# Patient Record
Sex: MALE | Race: BLACK OR AFRICAN AMERICAN | NOT HISPANIC OR LATINO
[De-identification: names, ages, dates, MRNs, and addresses within clinical notes are randomized per-mention and may not be internally consistent; named-entity substitution may affect disease eponyms.]

---

## 2017-01-03 ENCOUNTER — RESULT CHARGE (OUTPATIENT)
Age: 57
End: 2017-01-03

## 2017-01-03 ENCOUNTER — APPOINTMENT (OUTPATIENT)
Dept: INTERNAL MEDICINE | Facility: CLINIC | Age: 57
End: 2017-01-03

## 2017-01-03 ENCOUNTER — OUTPATIENT (OUTPATIENT)
Dept: OUTPATIENT SERVICES | Facility: HOSPITAL | Age: 57
LOS: 1 days | End: 2017-01-03
Payer: MEDICARE

## 2017-01-03 VITALS
BODY MASS INDEX: 26.72 KG/M2 | WEIGHT: 197 LBS | DIASTOLIC BLOOD PRESSURE: 78 MMHG | HEART RATE: 72 BPM | SYSTOLIC BLOOD PRESSURE: 140 MMHG

## 2017-01-03 DIAGNOSIS — E11.9 TYPE 2 DIABETES MELLITUS WITHOUT COMPLICATIONS: ICD-10-CM

## 2017-01-03 DIAGNOSIS — I10 ESSENTIAL (PRIMARY) HYPERTENSION: ICD-10-CM

## 2017-01-03 PROCEDURE — G0463: CPT

## 2017-01-04 LAB — HBA1C MFR BLD HPLC: 7.2

## 2017-01-24 ENCOUNTER — MEDICATION RENEWAL (OUTPATIENT)
Age: 57
End: 2017-01-24

## 2017-01-30 ENCOUNTER — RX RENEWAL (OUTPATIENT)
Age: 57
End: 2017-01-30

## 2017-03-27 ENCOUNTER — APPOINTMENT (OUTPATIENT)
Dept: GASTROENTEROLOGY | Facility: CLINIC | Age: 57
End: 2017-03-27

## 2017-03-27 VITALS
TEMPERATURE: 98.4 F | OXYGEN SATURATION: 97 % | BODY MASS INDEX: 26.82 KG/M2 | DIASTOLIC BLOOD PRESSURE: 70 MMHG | WEIGHT: 198 LBS | HEART RATE: 79 BPM | SYSTOLIC BLOOD PRESSURE: 138 MMHG | HEIGHT: 72 IN | RESPIRATION RATE: 15 BRPM

## 2017-03-27 DIAGNOSIS — F17.200 NICOTINE DEPENDENCE, UNSPECIFIED, UNCOMPLICATED: ICD-10-CM

## 2017-03-31 ENCOUNTER — RX RENEWAL (OUTPATIENT)
Age: 57
End: 2017-03-31

## 2017-03-31 ENCOUNTER — CHART COPY (OUTPATIENT)
Age: 57
End: 2017-03-31

## 2017-04-11 ENCOUNTER — APPOINTMENT (OUTPATIENT)
Dept: GASTROENTEROLOGY | Facility: HOSPITAL | Age: 57
End: 2017-04-11

## 2017-06-16 ENCOUNTER — APPOINTMENT (OUTPATIENT)
Dept: GASTROENTEROLOGY | Facility: HOSPITAL | Age: 57
End: 2017-06-16

## 2017-06-16 ENCOUNTER — RX RENEWAL (OUTPATIENT)
Age: 57
End: 2017-06-16

## 2017-07-12 ENCOUNTER — OUTPATIENT (OUTPATIENT)
Dept: OUTPATIENT SERVICES | Facility: HOSPITAL | Age: 57
LOS: 1 days | End: 2017-07-12

## 2017-07-12 ENCOUNTER — OUTPATIENT (OUTPATIENT)
Dept: OUTPATIENT SERVICES | Facility: HOSPITAL | Age: 57
LOS: 1 days | End: 2017-07-12
Payer: MEDICARE

## 2017-07-12 ENCOUNTER — APPOINTMENT (OUTPATIENT)
Dept: INTERNAL MEDICINE | Facility: CLINIC | Age: 57
End: 2017-07-12

## 2017-07-12 ENCOUNTER — RESULT CHARGE (OUTPATIENT)
Age: 57
End: 2017-07-12

## 2017-07-12 VITALS
DIASTOLIC BLOOD PRESSURE: 95 MMHG | WEIGHT: 197.09 LBS | HEART RATE: 69 BPM | SYSTOLIC BLOOD PRESSURE: 153 MMHG | OXYGEN SATURATION: 99 % | RESPIRATION RATE: 16 BRPM | TEMPERATURE: 98 F | HEIGHT: 72 IN

## 2017-07-12 VITALS
WEIGHT: 200 LBS | SYSTOLIC BLOOD PRESSURE: 120 MMHG | BODY MASS INDEX: 27.09 KG/M2 | HEIGHT: 72 IN | DIASTOLIC BLOOD PRESSURE: 90 MMHG

## 2017-07-12 DIAGNOSIS — Z12.11 ENCOUNTER FOR SCREENING FOR MALIGNANT NEOPLASM OF COLON: ICD-10-CM

## 2017-07-12 DIAGNOSIS — Z98.890 OTHER SPECIFIED POSTPROCEDURAL STATES: Chronic | ICD-10-CM

## 2017-07-12 DIAGNOSIS — E11.9 TYPE 2 DIABETES MELLITUS WITHOUT COMPLICATIONS: ICD-10-CM

## 2017-07-12 DIAGNOSIS — L89.892 PRESSURE ULCER OF OTHER SITE, STAGE 2: ICD-10-CM

## 2017-07-12 DIAGNOSIS — L02.31 CUTANEOUS ABSCESS OF BUTTOCK: Chronic | ICD-10-CM

## 2017-07-12 DIAGNOSIS — Z93.1 GASTROSTOMY STATUS: Chronic | ICD-10-CM

## 2017-07-12 DIAGNOSIS — I10 ESSENTIAL (PRIMARY) HYPERTENSION: ICD-10-CM

## 2017-07-12 DIAGNOSIS — Z01.818 ENCOUNTER FOR OTHER PREPROCEDURAL EXAMINATION: ICD-10-CM

## 2017-07-12 LAB
HCT VFR BLD CALC: 39.6 % — SIGNIFICANT CHANGE UP (ref 39–50)
HGB BLD-MCNC: 12.7 G/DL — LOW (ref 13–17)
MCHC RBC-ENTMCNC: 28.7 PG — SIGNIFICANT CHANGE UP (ref 27–34)
MCHC RBC-ENTMCNC: 32.1 GM/DL — SIGNIFICANT CHANGE UP (ref 32–36)
MCV RBC AUTO: 89.4 FL — SIGNIFICANT CHANGE UP (ref 80–100)
PLATELET # BLD AUTO: 164 K/UL — SIGNIFICANT CHANGE UP (ref 150–400)
RBC # BLD: 4.43 M/UL — SIGNIFICANT CHANGE UP (ref 4.2–5.8)
RBC # FLD: 13.3 % — SIGNIFICANT CHANGE UP (ref 10.3–14.5)
WBC # BLD: 5.47 K/UL — SIGNIFICANT CHANGE UP (ref 3.8–10.5)
WBC # FLD AUTO: 5.47 K/UL — SIGNIFICANT CHANGE UP (ref 3.8–10.5)

## 2017-07-12 PROCEDURE — 80048 BASIC METABOLIC PNL TOTAL CA: CPT

## 2017-07-12 PROCEDURE — 85027 COMPLETE CBC AUTOMATED: CPT

## 2017-07-12 PROCEDURE — G0463: CPT

## 2017-07-12 PROCEDURE — 82962 GLUCOSE BLOOD TEST: CPT

## 2017-07-12 RX ORDER — LISINOPRIL 2.5 MG/1
1 TABLET ORAL
Qty: 0 | Refills: 0 | COMMUNITY

## 2017-07-12 RX ORDER — ASPIRIN/CALCIUM CARB/MAGNESIUM 324 MG
1 TABLET ORAL
Qty: 0 | Refills: 0 | COMMUNITY

## 2017-07-12 RX ORDER — FLUTICASONE PROPIONATE 50 MCG
1 SPRAY, SUSPENSION NASAL
Qty: 0 | Refills: 0 | COMMUNITY

## 2017-07-12 RX ORDER — LABETALOL HCL 100 MG
500 TABLET ORAL
Qty: 0 | Refills: 0 | COMMUNITY

## 2017-07-12 RX ORDER — SIMVASTATIN 20 MG/1
1 TABLET, FILM COATED ORAL
Qty: 0 | Refills: 0 | COMMUNITY

## 2017-07-12 RX ORDER — AMLODIPINE BESYLATE 2.5 MG/1
1 TABLET ORAL
Qty: 0 | Refills: 0 | COMMUNITY

## 2017-07-12 RX ORDER — METFORMIN HYDROCHLORIDE 850 MG/1
1 TABLET ORAL
Qty: 0 | Refills: 0 | COMMUNITY

## 2017-07-12 NOTE — H&P PST ADULT - NS MD HP SKIN WOUND STATUS
2x2 cm ulcer Right anterior lower leg, no drainage, no signs of infection, no erythema. Ulcer was evaluated today, 07/12/17 by Dr. LEILA Kinney, Bacitracin ointment prescribed

## 2017-07-12 NOTE — H&P PST ADULT - PRIMARY CARE PROVIDER
Dr. LEILA Kinney ([facility], [address] [place] [address]) Dr. LEILA Kinney ([facility], [address] [place] [address]) [phone]

## 2017-07-12 NOTE — H&P PST ADULT - HISTORY OF PRESENT ILLNESS
57 y.o. male with h/o DM2, HTN, former drug use, anoxic brain injury in 2009 secondary to hypoglycemic episode presents for screening Colonoscopy. He denies any GI symptoms.

## 2017-07-12 NOTE — H&P PST ADULT - NSANTHOSAYNRD_GEN_A_CORE
No. FORTUNATO screening performed.  STOP BANG Legend: 0-2 = LOW Risk; 3-4 = INTERMEDIATE Risk; 5-8 = HIGH Risk

## 2017-07-12 NOTE — H&P PST ADULT - PMH
Anoxic brain injury  2009 secondary to hypoglycemic episode while on Insulin  Deviated nasal septum    Diabetes Mellitus  Type 2  Drug abuse in remission  since 2009  HTN    Hyperlipidemia    Leg ulcer, right, limited to breakdown of skin Anoxic brain injury  2009 secondary to hypoglycemic episode while on Insulin  Deviated nasal septum    Diabetes Mellitus  Type 2  Drug abuse in remission  since 2009  HTN    Hyperlipidemia    Leg ulcer, right, limited to breakdown of skin  07/2017  MRSA infection  Left buttock, 2008

## 2017-07-12 NOTE — H&P PST ADULT - PSH
H/O arthroscopy of right knee  2000  S/P percutaneous endoscopic gastrostomy (PEG) tube placement  2009, s/p anoxic brain injury, diabetic coma H/O arthroscopy of right knee  2000  Left buttock abscess  I&D, 2008 - MRSA  S/P percutaneous endoscopic gastrostomy (PEG) tube placement  2009, s/p anoxic brain injury, diabetic coma

## 2017-07-13 LAB
ANION GAP SERPL CALC-SCNC: 19 MMOL/L — HIGH (ref 5–17)
BUN SERPL-MCNC: 20 MG/DL — SIGNIFICANT CHANGE UP (ref 7–23)
CALCIUM SERPL-MCNC: 10.7 MG/DL — HIGH (ref 8.4–10.5)
CHLORIDE SERPL-SCNC: 98 MMOL/L — SIGNIFICANT CHANGE UP (ref 96–108)
CO2 SERPL-SCNC: 24 MMOL/L — SIGNIFICANT CHANGE UP (ref 22–31)
CREAT SERPL-MCNC: 1.09 MG/DL — SIGNIFICANT CHANGE UP (ref 0.5–1.3)
GLUCOSE BLDC GLUCOMTR-MCNC: 208
GLUCOSE SERPL-MCNC: 174 MG/DL — HIGH (ref 70–99)
HBA1C MFR BLD HPLC: 8.1
POTASSIUM SERPL-MCNC: 3.9 MMOL/L — SIGNIFICANT CHANGE UP (ref 3.5–5.3)
POTASSIUM SERPL-SCNC: 3.9 MMOL/L — SIGNIFICANT CHANGE UP (ref 3.5–5.3)
SODIUM SERPL-SCNC: 141 MMOL/L — SIGNIFICANT CHANGE UP (ref 135–145)

## 2017-07-21 ENCOUNTER — APPOINTMENT (OUTPATIENT)
Dept: PODIATRY | Facility: HOSPITAL | Age: 57
End: 2017-07-21

## 2017-07-21 ENCOUNTER — OUTPATIENT (OUTPATIENT)
Dept: OUTPATIENT SERVICES | Facility: HOSPITAL | Age: 57
LOS: 1 days | End: 2017-07-21
Payer: MEDICARE

## 2017-07-21 VITALS
SYSTOLIC BLOOD PRESSURE: 158 MMHG | BODY MASS INDEX: 27.09 KG/M2 | HEIGHT: 72 IN | WEIGHT: 200 LBS | RESPIRATION RATE: 14 BRPM | DIASTOLIC BLOOD PRESSURE: 72 MMHG

## 2017-07-21 DIAGNOSIS — E11.9 TYPE 2 DIABETES MELLITUS WITHOUT COMPLICATIONS: ICD-10-CM

## 2017-07-21 DIAGNOSIS — Z98.890 OTHER SPECIFIED POSTPROCEDURAL STATES: Chronic | ICD-10-CM

## 2017-07-21 DIAGNOSIS — B35.3 TINEA PEDIS: ICD-10-CM

## 2017-07-21 DIAGNOSIS — Z93.1 GASTROSTOMY STATUS: Chronic | ICD-10-CM

## 2017-07-21 DIAGNOSIS — M79.609 PAIN IN UNSPECIFIED LIMB: ICD-10-CM

## 2017-07-21 DIAGNOSIS — L02.31 CUTANEOUS ABSCESS OF BUTTOCK: Chronic | ICD-10-CM

## 2017-07-21 DIAGNOSIS — M20.41 OTHER HAMMER TOE(S) (ACQUIRED), RIGHT FOOT: ICD-10-CM

## 2017-07-21 PROCEDURE — G0463: CPT

## 2017-07-26 ENCOUNTER — RESULT REVIEW (OUTPATIENT)
Age: 57
End: 2017-07-26

## 2017-07-26 ENCOUNTER — APPOINTMENT (OUTPATIENT)
Dept: GASTROENTEROLOGY | Facility: HOSPITAL | Age: 57
End: 2017-07-26

## 2017-07-26 ENCOUNTER — OUTPATIENT (OUTPATIENT)
Dept: OUTPATIENT SERVICES | Facility: HOSPITAL | Age: 57
LOS: 1 days | End: 2017-07-26
Payer: MEDICARE

## 2017-07-26 DIAGNOSIS — Z98.890 OTHER SPECIFIED POSTPROCEDURAL STATES: Chronic | ICD-10-CM

## 2017-07-26 DIAGNOSIS — Z01.818 ENCOUNTER FOR OTHER PREPROCEDURAL EXAMINATION: ICD-10-CM

## 2017-07-26 DIAGNOSIS — L02.31 CUTANEOUS ABSCESS OF BUTTOCK: Chronic | ICD-10-CM

## 2017-07-26 DIAGNOSIS — Z12.11 ENCOUNTER FOR SCREENING FOR MALIGNANT NEOPLASM OF COLON: ICD-10-CM

## 2017-07-26 DIAGNOSIS — Z93.1 GASTROSTOMY STATUS: Chronic | ICD-10-CM

## 2017-07-26 PROCEDURE — 45380 COLONOSCOPY AND BIOPSY: CPT | Mod: PT

## 2017-07-26 PROCEDURE — 88305 TISSUE EXAM BY PATHOLOGIST: CPT | Mod: 26

## 2017-07-26 PROCEDURE — 43239 EGD BIOPSY SINGLE/MULTIPLE: CPT

## 2017-07-26 PROCEDURE — 88305 TISSUE EXAM BY PATHOLOGIST: CPT

## 2017-08-02 ENCOUNTER — OTHER (OUTPATIENT)
Age: 57
End: 2017-08-02

## 2017-08-18 ENCOUNTER — APPOINTMENT (OUTPATIENT)
Dept: PODIATRY | Facility: HOSPITAL | Age: 57
End: 2017-08-18

## 2017-08-18 ENCOUNTER — OUTPATIENT (OUTPATIENT)
Dept: OUTPATIENT SERVICES | Facility: HOSPITAL | Age: 57
LOS: 1 days | End: 2017-08-18
Payer: MEDICARE

## 2017-08-18 VITALS
SYSTOLIC BLOOD PRESSURE: 164 MMHG | HEIGHT: 72 IN | BODY MASS INDEX: 26.01 KG/M2 | RESPIRATION RATE: 16 BRPM | DIASTOLIC BLOOD PRESSURE: 93 MMHG | HEART RATE: 73 BPM | WEIGHT: 192 LBS

## 2017-08-18 DIAGNOSIS — Z93.1 GASTROSTOMY STATUS: Chronic | ICD-10-CM

## 2017-08-18 DIAGNOSIS — M79.609 PAIN IN UNSPECIFIED LIMB: ICD-10-CM

## 2017-08-18 DIAGNOSIS — L30.9 DERMATITIS, UNSPECIFIED: ICD-10-CM

## 2017-08-18 DIAGNOSIS — Z98.890 OTHER SPECIFIED POSTPROCEDURAL STATES: Chronic | ICD-10-CM

## 2017-08-18 DIAGNOSIS — M20.41 OTHER HAMMER TOE(S) (ACQUIRED), RIGHT FOOT: ICD-10-CM

## 2017-08-18 DIAGNOSIS — E11.49 TYPE 2 DIABETES MELLITUS WITH OTHER DIABETIC NEUROLOGICAL COMPLICATION: ICD-10-CM

## 2017-08-18 DIAGNOSIS — L02.31 CUTANEOUS ABSCESS OF BUTTOCK: Chronic | ICD-10-CM

## 2017-08-18 PROCEDURE — G0463: CPT

## 2017-09-22 ENCOUNTER — APPOINTMENT (OUTPATIENT)
Dept: PODIATRY | Facility: HOSPITAL | Age: 57
End: 2017-09-22

## 2017-09-25 ENCOUNTER — RX RENEWAL (OUTPATIENT)
Age: 57
End: 2017-09-25

## 2017-10-03 ENCOUNTER — RX RENEWAL (OUTPATIENT)
Age: 57
End: 2017-10-03

## 2017-10-30 ENCOUNTER — RX RENEWAL (OUTPATIENT)
Age: 57
End: 2017-10-30

## 2017-11-27 ENCOUNTER — RX RENEWAL (OUTPATIENT)
Age: 57
End: 2017-11-27

## 2017-12-01 ENCOUNTER — RX RENEWAL (OUTPATIENT)
Age: 57
End: 2017-12-01

## 2018-01-08 ENCOUNTER — RX RENEWAL (OUTPATIENT)
Age: 58
End: 2018-01-08

## 2018-02-01 ENCOUNTER — APPOINTMENT (OUTPATIENT)
Dept: OPHTHALMOLOGY | Facility: CLINIC | Age: 58
End: 2018-02-01
Payer: MEDICARE

## 2018-02-01 PROCEDURE — 92133 CPTRZD OPH DX IMG PST SGM ON: CPT

## 2018-02-01 PROCEDURE — 92014 COMPRE OPH EXAM EST PT 1/>: CPT

## 2018-02-05 ENCOUNTER — MEDICATION RENEWAL (OUTPATIENT)
Age: 58
End: 2018-02-05

## 2018-02-05 ENCOUNTER — OUTPATIENT (OUTPATIENT)
Dept: OUTPATIENT SERVICES | Facility: HOSPITAL | Age: 58
LOS: 1 days | End: 2018-02-05
Payer: MEDICARE

## 2018-02-05 ENCOUNTER — APPOINTMENT (OUTPATIENT)
Dept: INTERNAL MEDICINE | Facility: CLINIC | Age: 58
End: 2018-02-05
Payer: MEDICARE

## 2018-02-05 VITALS
DIASTOLIC BLOOD PRESSURE: 70 MMHG | WEIGHT: 197 LBS | SYSTOLIC BLOOD PRESSURE: 110 MMHG | HEIGHT: 72 IN | BODY MASS INDEX: 26.68 KG/M2

## 2018-02-05 DIAGNOSIS — Z98.890 OTHER SPECIFIED POSTPROCEDURAL STATES: Chronic | ICD-10-CM

## 2018-02-05 DIAGNOSIS — E11.9 TYPE 2 DIABETES MELLITUS WITHOUT COMPLICATIONS: ICD-10-CM

## 2018-02-05 DIAGNOSIS — I10 ESSENTIAL (PRIMARY) HYPERTENSION: ICD-10-CM

## 2018-02-05 DIAGNOSIS — L02.31 CUTANEOUS ABSCESS OF BUTTOCK: Chronic | ICD-10-CM

## 2018-02-05 DIAGNOSIS — F17.200 NICOTINE DEPENDENCE, UNSPECIFIED, UNCOMPLICATED: ICD-10-CM

## 2018-02-05 DIAGNOSIS — Z93.1 GASTROSTOMY STATUS: Chronic | ICD-10-CM

## 2018-02-05 PROCEDURE — 99213 OFFICE O/P EST LOW 20 MIN: CPT | Mod: GE

## 2018-02-05 PROCEDURE — G0463: CPT

## 2018-02-06 ENCOUNTER — APPOINTMENT (OUTPATIENT)
Dept: OTOLARYNGOLOGY | Facility: CLINIC | Age: 58
End: 2018-02-06
Payer: MEDICARE

## 2018-02-06 VITALS
SYSTOLIC BLOOD PRESSURE: 161 MMHG | HEIGHT: 72 IN | BODY MASS INDEX: 26.68 KG/M2 | DIASTOLIC BLOOD PRESSURE: 76 MMHG | WEIGHT: 197 LBS | HEART RATE: 75 BPM

## 2018-02-06 DIAGNOSIS — Z82.1 FAMILY HISTORY OF BLINDNESS AND VISUAL LOSS: ICD-10-CM

## 2018-02-06 LAB — HBA1C MFR BLD HPLC: 7.5

## 2018-02-06 PROCEDURE — 92567 TYMPANOMETRY: CPT

## 2018-02-06 PROCEDURE — 92557 COMPREHENSIVE HEARING TEST: CPT

## 2018-02-06 PROCEDURE — 99214 OFFICE O/P EST MOD 30 MIN: CPT

## 2018-02-08 ENCOUNTER — APPOINTMENT (OUTPATIENT)
Dept: OPHTHALMOLOGY | Facility: CLINIC | Age: 58
End: 2018-02-08
Payer: MEDICARE

## 2018-02-08 PROCEDURE — 92083 EXTENDED VISUAL FIELD XM: CPT

## 2018-02-08 PROCEDURE — 92012 INTRM OPH EXAM EST PATIENT: CPT

## 2018-02-08 PROCEDURE — 76514 ECHO EXAM OF EYE THICKNESS: CPT

## 2018-03-16 ENCOUNTER — APPOINTMENT (OUTPATIENT)
Dept: INTERNAL MEDICINE | Facility: CLINIC | Age: 58
End: 2018-03-16

## 2018-03-29 ENCOUNTER — APPOINTMENT (OUTPATIENT)
Dept: OPHTHALMOLOGY | Facility: CLINIC | Age: 58
End: 2018-03-29

## 2018-04-02 ENCOUNTER — MEDICATION RENEWAL (OUTPATIENT)
Age: 58
End: 2018-04-02

## 2018-04-16 ENCOUNTER — APPOINTMENT (OUTPATIENT)
Dept: INTERNAL MEDICINE | Facility: CLINIC | Age: 58
End: 2018-04-16
Payer: MEDICARE

## 2018-04-16 ENCOUNTER — LABORATORY RESULT (OUTPATIENT)
Age: 58
End: 2018-04-16

## 2018-04-16 ENCOUNTER — OUTPATIENT (OUTPATIENT)
Dept: OUTPATIENT SERVICES | Facility: HOSPITAL | Age: 58
LOS: 1 days | End: 2018-04-16
Payer: MEDICARE

## 2018-04-16 VITALS
SYSTOLIC BLOOD PRESSURE: 130 MMHG | BODY MASS INDEX: 26.68 KG/M2 | HEIGHT: 72 IN | WEIGHT: 197 LBS | DIASTOLIC BLOOD PRESSURE: 90 MMHG

## 2018-04-16 DIAGNOSIS — I10 ESSENTIAL (PRIMARY) HYPERTENSION: ICD-10-CM

## 2018-04-16 DIAGNOSIS — E11.9 TYPE 2 DIABETES MELLITUS WITHOUT COMPLICATIONS: ICD-10-CM

## 2018-04-16 DIAGNOSIS — L02.31 CUTANEOUS ABSCESS OF BUTTOCK: Chronic | ICD-10-CM

## 2018-04-16 DIAGNOSIS — Z98.890 OTHER SPECIFIED POSTPROCEDURAL STATES: Chronic | ICD-10-CM

## 2018-04-16 DIAGNOSIS — F17.200 NICOTINE DEPENDENCE, UNSPECIFIED, UNCOMPLICATED: ICD-10-CM

## 2018-04-16 DIAGNOSIS — Z93.1 GASTROSTOMY STATUS: Chronic | ICD-10-CM

## 2018-04-16 PROCEDURE — 82043 UR ALBUMIN QUANTITATIVE: CPT

## 2018-04-16 PROCEDURE — G0463: CPT

## 2018-04-16 PROCEDURE — 99213 OFFICE O/P EST LOW 20 MIN: CPT | Mod: GE

## 2018-04-17 LAB
CREAT ?TM UR-MCNC: 60 MG/DL — SIGNIFICANT CHANGE UP
MICROALBUMIN UR-MCNC: 26.2 MG/DL — SIGNIFICANT CHANGE UP
MICROALBUMIN/CREAT UR-RTO: 437 MG/G — HIGH (ref 0–30)

## 2018-04-18 LAB
ALBUMIN SERPL ELPH-MCNC: 4.2 G/DL
ALP BLD-CCNC: 81 U/L
ALT SERPL-CCNC: 21 U/L
ANION GAP SERPL CALC-SCNC: 13 MMOL/L
AST SERPL-CCNC: 22 U/L
BASOPHILS # BLD AUTO: 0.02 K/UL
BASOPHILS NFR BLD AUTO: 0.4 %
BILIRUB SERPL-MCNC: 0.4 MG/DL
BUN SERPL-MCNC: 17 MG/DL
CALCIUM SERPL-MCNC: 9.9 MG/DL
CHLORIDE SERPL-SCNC: 102 MMOL/L
CHOLEST SERPL-MCNC: 128 MG/DL
CHOLEST/HDLC SERPL: 3.3 RATIO
CO2 SERPL-SCNC: 24 MMOL/L
CREAT SERPL-MCNC: 1 MG/DL
EOSINOPHIL # BLD AUTO: 0.35 K/UL
EOSINOPHIL NFR BLD AUTO: 6.4 %
GLUCOSE SERPL-MCNC: 197 MG/DL
HBA1C MFR BLD HPLC: 7 %
HCT VFR BLD CALC: 36.6 %
HDLC SERPL-MCNC: 39 MG/DL
HGB BLD-MCNC: 12.1 G/DL
IMM GRANULOCYTES NFR BLD AUTO: 0.2 %
LDLC SERPL CALC-MCNC: 70 MG/DL
LYMPHOCYTES # BLD AUTO: 1.81 K/UL
LYMPHOCYTES NFR BLD AUTO: 33.2 %
MAN DIFF?: NORMAL
MCHC RBC-ENTMCNC: 29.7 PG
MCHC RBC-ENTMCNC: 33.1 GM/DL
MCV RBC AUTO: 89.7 FL
MONOCYTES # BLD AUTO: 0.33 K/UL
MONOCYTES NFR BLD AUTO: 6 %
NEUTROPHILS # BLD AUTO: 2.94 K/UL
NEUTROPHILS NFR BLD AUTO: 53.8 %
PLATELET # BLD AUTO: 152 K/UL
POTASSIUM SERPL-SCNC: 3.9 MMOL/L
PROT SERPL-MCNC: 7.5 G/DL
RBC # BLD: 4.08 M/UL
RBC # FLD: 13.5 %
SODIUM SERPL-SCNC: 139 MMOL/L
TRIGL SERPL-MCNC: 93 MG/DL
WBC # FLD AUTO: 5.46 K/UL

## 2018-04-18 RX ORDER — POLYETHYLENE GLYCOL 3350 AND ELECTROLYTES WITH LEMON FLAVOR 236; 22.74; 6.74; 5.86; 2.97 G/4L; G/4L; G/4L; G/4L; G/4L
236 POWDER, FOR SOLUTION ORAL
Qty: 1 | Refills: 0 | Status: DISCONTINUED | COMMUNITY
Start: 2017-03-27 | End: 2018-04-18

## 2018-04-18 RX ORDER — TRIAMCINOLONE ACETONIDE 1 MG/G
0.1 CREAM TOPICAL TWICE DAILY
Qty: 1 | Refills: 3 | Status: DISCONTINUED | COMMUNITY
Start: 2017-08-18 | End: 2018-04-18

## 2018-04-18 RX ORDER — OSTOMY SUPPLY 2 3/4"
EACH MISCELLANEOUS
Qty: 14 | Refills: 0 | Status: DISCONTINUED | COMMUNITY
Start: 2017-07-12 | End: 2018-04-18

## 2018-04-18 RX ORDER — NICOTINE 21 MG/24HR
14 PATCH, TRANSDERMAL 24 HOURS TRANSDERMAL
Qty: 30 | Refills: 0 | Status: DISCONTINUED | COMMUNITY
Start: 2017-03-24 | End: 2018-04-18

## 2018-04-18 RX ORDER — CLOTRIMAZOLE 10 MG/ML
1 SOLUTION TOPICAL
Qty: 30 | Refills: 5 | Status: DISCONTINUED | COMMUNITY
Start: 2017-07-21 | End: 2018-04-18

## 2018-04-18 RX ORDER — FLUTICASONE PROPIONATE 50 UG/1
50 SPRAY, METERED NASAL DAILY
Qty: 1 | Refills: 3 | Status: DISCONTINUED | COMMUNITY
Start: 2018-02-05 | End: 2018-04-18

## 2018-07-16 PROBLEM — L97.911: Chronic | Status: ACTIVE | Noted: 2017-07-12

## 2018-07-16 PROBLEM — G93.1 ANOXIC BRAIN DAMAGE, NOT ELSEWHERE CLASSIFIED: Chronic | Status: ACTIVE | Noted: 2017-07-12

## 2018-07-19 ENCOUNTER — MEDICATION RENEWAL (OUTPATIENT)
Age: 58
End: 2018-07-19

## 2018-10-12 ENCOUNTER — MEDICATION RENEWAL (OUTPATIENT)
Age: 58
End: 2018-10-12

## 2018-10-15 ENCOUNTER — RX RENEWAL (OUTPATIENT)
Age: 58
End: 2018-10-15

## 2018-10-17 ENCOUNTER — RX RENEWAL (OUTPATIENT)
Age: 58
End: 2018-10-17

## 2018-11-02 PROBLEM — A49.02 METHICILLIN RESISTANT STAPHYLOCOCCUS AUREUS INFECTION, UNSPECIFIED SITE: Chronic | Status: ACTIVE | Noted: 2017-07-12

## 2018-11-02 PROBLEM — E78.5 HYPERLIPIDEMIA, UNSPECIFIED: Chronic | Status: ACTIVE | Noted: 2017-07-12

## 2018-11-02 PROBLEM — J34.2 DEVIATED NASAL SEPTUM: Chronic | Status: ACTIVE | Noted: 2017-07-12

## 2018-11-02 PROBLEM — Z87.898 PERSONAL HISTORY OF OTHER SPECIFIED CONDITIONS: Chronic | Status: ACTIVE | Noted: 2017-07-12

## 2018-12-18 ENCOUNTER — MED ADMIN CHARGE (OUTPATIENT)
Age: 58
End: 2018-12-18

## 2018-12-18 ENCOUNTER — APPOINTMENT (OUTPATIENT)
Dept: INTERNAL MEDICINE | Facility: CLINIC | Age: 58
End: 2018-12-18
Payer: MEDICARE

## 2018-12-18 ENCOUNTER — OUTPATIENT (OUTPATIENT)
Dept: OUTPATIENT SERVICES | Facility: HOSPITAL | Age: 58
LOS: 1 days | End: 2018-12-18
Payer: MEDICARE

## 2018-12-18 VITALS
HEIGHT: 72 IN | WEIGHT: 190 LBS | DIASTOLIC BLOOD PRESSURE: 90 MMHG | BODY MASS INDEX: 25.73 KG/M2 | SYSTOLIC BLOOD PRESSURE: 142 MMHG

## 2018-12-18 DIAGNOSIS — F17.200 NICOTINE DEPENDENCE, UNSPECIFIED, UNCOMPLICATED: ICD-10-CM

## 2018-12-18 DIAGNOSIS — I10 ESSENTIAL (PRIMARY) HYPERTENSION: ICD-10-CM

## 2018-12-18 DIAGNOSIS — Z93.1 GASTROSTOMY STATUS: Chronic | ICD-10-CM

## 2018-12-18 DIAGNOSIS — Z98.890 OTHER SPECIFIED POSTPROCEDURAL STATES: Chronic | ICD-10-CM

## 2018-12-18 DIAGNOSIS — E11.9 TYPE 2 DIABETES MELLITUS WITHOUT COMPLICATIONS: ICD-10-CM

## 2018-12-18 DIAGNOSIS — L02.31 CUTANEOUS ABSCESS OF BUTTOCK: Chronic | ICD-10-CM

## 2018-12-18 PROCEDURE — 90656 IIV3 VACC NO PRSV 0.5 ML IM: CPT

## 2018-12-18 PROCEDURE — G0463: CPT

## 2018-12-18 PROCEDURE — G0008: CPT

## 2018-12-18 PROCEDURE — 99213 OFFICE O/P EST LOW 20 MIN: CPT | Mod: GE

## 2018-12-18 RX ORDER — FLUTICASONE PROPIONATE 50 UG/1
50 SPRAY, METERED NASAL DAILY
Qty: 1 | Refills: 3 | Status: DISCONTINUED | COMMUNITY
Start: 2018-04-02 | End: 2018-12-18

## 2018-12-18 RX ORDER — FLUTICASONE PROPIONATE 50 UG/1
50 SPRAY, METERED NASAL DAILY
Qty: 1 | Refills: 3 | Status: DISCONTINUED | COMMUNITY
Start: 2018-07-19 | End: 2018-12-18

## 2018-12-18 RX ORDER — FLUTICASONE PROPIONATE 50 UG/1
50 SPRAY, METERED NASAL DAILY
Qty: 1 | Refills: 3 | Status: DISCONTINUED | COMMUNITY
Start: 2018-11-20 | End: 2018-12-18

## 2018-12-18 RX ORDER — NICOTINE 21 MG/24HR
14 PATCH, TRANSDERMAL 24 HOURS TRANSDERMAL DAILY
Qty: 90 | Refills: 1 | Status: DISCONTINUED | COMMUNITY
Start: 2017-03-21 | End: 2018-12-18

## 2018-12-18 RX ORDER — FLUTICASONE PROPIONATE 50 UG/1
50 SPRAY, METERED NASAL DAILY
Qty: 1 | Refills: 3 | Status: DISCONTINUED | COMMUNITY
Start: 2018-02-06 | End: 2018-12-18

## 2018-12-18 NOTE — PHYSICAL EXAM
[No Acute Distress] : no acute distress [Well Nourished] : well nourished [Well Developed] : well developed [Normal Sclera/Conjunctiva] : normal sclera/conjunctiva [PERRL] : pupils equal round and reactive to light [EOMI] : extraocular movements intact [Normal Outer Ear/Nose] : the outer ears and nose were normal in appearance [Normal Oropharynx] : the oropharynx was normal [No JVD] : no jugular venous distention [Supple] : supple [No Respiratory Distress] : no respiratory distress  [Clear to Auscultation] : lungs were clear to auscultation bilaterally [No Accessory Muscle Use] : no accessory muscle use [Normal Rate] : normal rate  [Regular Rhythm] : with a regular rhythm [Normal S1, S2] : normal S1 and S2 [Soft] : abdomen soft [Non Tender] : non-tender [Non-distended] : non-distended [No CVA Tenderness] : no CVA  tenderness [No Spinal Tenderness] : no spinal tenderness [No Joint Swelling] : no joint swelling [Grossly Normal Strength/Tone] : grossly normal strength/tone [No Rash] : no rash [No Skin Lesions] : no skin lesions [Normal Gait] : normal gait [Coordination Grossly Intact] : coordination grossly intact [No Focal Deficits] : no focal deficits [Speech Grossly Normal] : speech grossly normal [Normal Affect] : the affect was normal [Normal Mood] : the mood was normal

## 2018-12-19 NOTE — REVIEW OF SYSTEMS
[Fever] : no fever [Chills] : no chills [Earache] : no earache [Hearing Loss] : no hearing loss [Sore Throat] : no sore throat [Chest Pain] : no chest pain [Palpitations] : no palpitations [Shortness Of Breath] : no shortness of breath [Cough] : no cough [Abdominal Pain] : no abdominal pain [Nausea] : no nausea [Constipation] : no constipation [Diarrhea] : no diarrhea [Vomiting] : no vomiting [Dysuria] : no dysuria [Frequency] : no frequency [Joint Pain] : no joint pain [Back Pain] : no back pain [Itching] : no itching [Skin Rash] : no skin rash [Headache] : no headache [Dizziness] : no dizziness

## 2018-12-19 NOTE — ASSESSMENT
[FreeTextEntry1] : Mr. Matias is a 56 yo male with history of DM2, HTN, anoxic brain injury 2/2 hypoglycemic episode 5 years ago, HLD present for follow up visit. \par \par \par #DM\par -Check POCT A1c\par -Continue with current regimen metformin 1g BID\par -C/w lifestyle and diet modification\par -Ophthalmology and podiatry referral given\par \par #HTN\par -BP well controlled\par -C/w current regimen\par \par #Nicotine Dependence\par -Smoking cessation program referral given\par -Explained extensively regarding risk of smoking. \par \par #HCM\par -Flu shot given\par -Colonoscopy UTD in 2017 - 5mm polyp in ascending colon - negative for malignancy - repeat in 10 years\par -RTC in 4 months for CPE.\par \par \par D/w Dr. Arroyo\par \par He Dwight - PGY2\par [employer]

## 2018-12-19 NOTE — HISTORY OF PRESENT ILLNESS
[de-identified] : Mr. Matias is a 58 yo male with history of DM2, HTN, anoxic brain injury 2/2 hypoglycemic episode 5 years ago, HLD present for follow up visit. \par \par Patient has not been checking his FS howevere reports has been eating healthy. Still smoking about 1/2 to 1 pack per day. Has not tried to use the nicotine patches. Does not check BP at home but stating will try to get a BP machine. No other complaint at this time. \par

## 2019-01-08 ENCOUNTER — RX RENEWAL (OUTPATIENT)
Age: 59
End: 2019-01-08

## 2019-02-25 ENCOUNTER — RX RENEWAL (OUTPATIENT)
Age: 59
End: 2019-02-25

## 2019-06-10 ENCOUNTER — LABORATORY RESULT (OUTPATIENT)
Age: 59
End: 2019-06-10

## 2019-06-11 ENCOUNTER — APPOINTMENT (OUTPATIENT)
Dept: INTERNAL MEDICINE | Facility: CLINIC | Age: 59
End: 2019-06-11
Payer: MEDICARE

## 2019-06-11 ENCOUNTER — OUTPATIENT (OUTPATIENT)
Dept: OUTPATIENT SERVICES | Facility: HOSPITAL | Age: 59
LOS: 1 days | End: 2019-06-11
Payer: MEDICARE

## 2019-06-11 VITALS
DIASTOLIC BLOOD PRESSURE: 80 MMHG | WEIGHT: 184 LBS | BODY MASS INDEX: 26.05 KG/M2 | HEART RATE: 90 BPM | SYSTOLIC BLOOD PRESSURE: 154 MMHG | HEIGHT: 70.5 IN | OXYGEN SATURATION: 96 %

## 2019-06-11 DIAGNOSIS — H90.3 SENSORINEURAL HEARING LOSS, BILATERAL: ICD-10-CM

## 2019-06-11 DIAGNOSIS — H40.003 PREGLAUCOMA, UNSPECIFIED, BILATERAL: ICD-10-CM

## 2019-06-11 DIAGNOSIS — Z12.11 ENCOUNTER FOR SCREENING FOR MALIGNANT NEOPLASM OF COLON: ICD-10-CM

## 2019-06-11 DIAGNOSIS — Z87.2 PERSONAL HISTORY OF DISEASES OF THE SKIN AND SUBCUTANEOUS TISSUE: ICD-10-CM

## 2019-06-11 DIAGNOSIS — E11.49 TYPE 2 DIABETES MELLITUS WITH OTHER DIABETIC NEUROLOGICAL COMPLICATION: ICD-10-CM

## 2019-06-11 DIAGNOSIS — H69.82 OTHER SPECIFIED DISORDERS OF EUSTACHIAN TUBE, LEFT EAR: ICD-10-CM

## 2019-06-11 DIAGNOSIS — Z92.29 PERSONAL HISTORY OF OTHER DRUG THERAPY: ICD-10-CM

## 2019-06-11 DIAGNOSIS — R43.9 UNSPECIFIED DISTURBANCES OF SMELL AND TASTE: ICD-10-CM

## 2019-06-11 DIAGNOSIS — B35.3 TINEA PEDIS: ICD-10-CM

## 2019-06-11 DIAGNOSIS — Z98.890 OTHER SPECIFIED POSTPROCEDURAL STATES: Chronic | ICD-10-CM

## 2019-06-11 DIAGNOSIS — R07.0 PAIN IN THROAT: ICD-10-CM

## 2019-06-11 DIAGNOSIS — H90.5 UNSPECIFIED SENSORINEURAL HEARING LOSS: ICD-10-CM

## 2019-06-11 DIAGNOSIS — M20.41 OTHER HAMMER TOE(S) (ACQUIRED), RIGHT FOOT: ICD-10-CM

## 2019-06-11 DIAGNOSIS — Z87.09 PERSONAL HISTORY OF OTHER DISEASES OF THE RESPIRATORY SYSTEM: ICD-10-CM

## 2019-06-11 DIAGNOSIS — M20.42 OTHER HAMMER TOE(S) (ACQUIRED), RIGHT FOOT: ICD-10-CM

## 2019-06-11 DIAGNOSIS — L02.31 CUTANEOUS ABSCESS OF BUTTOCK: Chronic | ICD-10-CM

## 2019-06-11 DIAGNOSIS — Z93.1 GASTROSTOMY STATUS: Chronic | ICD-10-CM

## 2019-06-11 DIAGNOSIS — I10 ESSENTIAL (PRIMARY) HYPERTENSION: ICD-10-CM

## 2019-06-11 PROCEDURE — 85027 COMPLETE CBC AUTOMATED: CPT

## 2019-06-11 PROCEDURE — 90715 TDAP VACCINE 7 YRS/> IM: CPT

## 2019-06-11 PROCEDURE — 99213 OFFICE O/P EST LOW 20 MIN: CPT | Mod: GE

## 2019-06-11 PROCEDURE — 36415 COLL VENOUS BLD VENIPUNCTURE: CPT

## 2019-06-11 PROCEDURE — 80053 COMPREHEN METABOLIC PANEL: CPT

## 2019-06-11 PROCEDURE — G0463: CPT

## 2019-06-11 PROCEDURE — 83036 HEMOGLOBIN GLYCOSYLATED A1C: CPT

## 2019-06-11 PROCEDURE — 80061 LIPID PANEL: CPT

## 2019-06-11 PROCEDURE — 82043 UR ALBUMIN QUANTITATIVE: CPT

## 2019-06-11 PROCEDURE — 90471 IMMUNIZATION ADMIN: CPT

## 2019-06-11 NOTE — PHYSICAL EXAM
[No Acute Distress] : no acute distress [Well Nourished] : well nourished [Well Developed] : well developed [Normal Sclera/Conjunctiva] : normal sclera/conjunctiva [PERRL] : pupils equal round and reactive to light [Normal Outer Ear/Nose] : the outer ears and nose were normal in appearance [EOMI] : extraocular movements intact [No JVD] : no jugular venous distention [Clear to Auscultation] : lungs were clear to auscultation bilaterally [No Respiratory Distress] : no respiratory distress  [Supple] : supple [Normal Rate] : normal rate  [Regular Rhythm] : with a regular rhythm [No Murmur] : no murmur heard [Normal S1, S2] : normal S1 and S2 [Soft] : abdomen soft [Non Tender] : non-tender [Non-distended] : non-distended [No CVA Tenderness] : no CVA  tenderness [No Spinal Tenderness] : no spinal tenderness [No Joint Swelling] : no joint swelling [Grossly Normal Strength/Tone] : grossly normal strength/tone [No Rash] : no rash [No Skin Lesions] : no skin lesions [Normal Gait] : normal gait [Coordination Grossly Intact] : coordination grossly intact [No Focal Deficits] : no focal deficits [Speech Grossly Normal] : speech grossly normal [Normal Affect] : the affect was normal [Normal Mood] : the mood was normal

## 2019-06-12 LAB
ALBUMIN SERPL ELPH-MCNC: 4.4 G/DL — SIGNIFICANT CHANGE UP (ref 3.3–5)
ALP SERPL-CCNC: 114 U/L — SIGNIFICANT CHANGE UP (ref 40–120)
ALT FLD-CCNC: 23 U/L — SIGNIFICANT CHANGE UP (ref 10–45)
ANION GAP SERPL CALC-SCNC: 13 MMOL/L — SIGNIFICANT CHANGE UP (ref 5–17)
AST SERPL-CCNC: 15 U/L — SIGNIFICANT CHANGE UP (ref 10–40)
BILIRUB SERPL-MCNC: 0.5 MG/DL — SIGNIFICANT CHANGE UP (ref 0.2–1.2)
BUN SERPL-MCNC: 13 MG/DL — SIGNIFICANT CHANGE UP (ref 7–23)
CALCIUM SERPL-MCNC: 10.2 MG/DL — SIGNIFICANT CHANGE UP (ref 8.4–10.5)
CHLORIDE SERPL-SCNC: 105 MMOL/L — SIGNIFICANT CHANGE UP (ref 96–108)
CHOLEST SERPL-MCNC: 144 MG/DL — SIGNIFICANT CHANGE UP (ref 10–199)
CO2 SERPL-SCNC: 26 MMOL/L — SIGNIFICANT CHANGE UP (ref 22–31)
CREAT ?TM UR-MCNC: 256 MG/DL — SIGNIFICANT CHANGE UP
CREAT SERPL-MCNC: 1.18 MG/DL — SIGNIFICANT CHANGE UP (ref 0.5–1.3)
ESTIMATED AVERAGE GLUCOSE: 160 MG/DL — HIGH (ref 68–114)
GLUCOSE SERPL-MCNC: 205 MG/DL — HIGH (ref 70–99)
HBA1C BLD-MCNC: 7.2 % — HIGH (ref 4–5.6)
HCT VFR BLD CALC: 39.6 % — SIGNIFICANT CHANGE UP (ref 39–50)
HDLC SERPL-MCNC: 41 MG/DL — SIGNIFICANT CHANGE UP
HGB BLD-MCNC: 12.3 G/DL — LOW (ref 13–17)
LIPID PNL WITH DIRECT LDL SERPL: 79 MG/DL — SIGNIFICANT CHANGE UP
MCHC RBC-ENTMCNC: 29.6 PG — SIGNIFICANT CHANGE UP (ref 27–34)
MCHC RBC-ENTMCNC: 31.1 GM/DL — LOW (ref 32–36)
MCV RBC AUTO: 95.2 FL — SIGNIFICANT CHANGE UP (ref 80–100)
MICROALBUMIN UR-MCNC: 168.3 MG/DL — SIGNIFICANT CHANGE UP
MICROALBUMIN UR-MCNC: 168.3 MG/DL — SIGNIFICANT CHANGE UP
MICROALBUMIN/CREAT UR-RTO: 659 MG/G — HIGH (ref 0–30)
PLATELET # BLD AUTO: 166 K/UL — SIGNIFICANT CHANGE UP (ref 150–400)
POTASSIUM SERPL-MCNC: 3.9 MMOL/L — SIGNIFICANT CHANGE UP (ref 3.5–5.3)
POTASSIUM SERPL-SCNC: 3.9 MMOL/L — SIGNIFICANT CHANGE UP (ref 3.5–5.3)
PROT SERPL-MCNC: 7.1 G/DL — SIGNIFICANT CHANGE UP (ref 6–8.3)
RBC # BLD: 4.16 M/UL — LOW (ref 4.2–5.8)
RBC # FLD: 13.3 % — SIGNIFICANT CHANGE UP (ref 10.3–14.5)
SODIUM SERPL-SCNC: 144 MMOL/L — SIGNIFICANT CHANGE UP (ref 135–145)
TOTAL CHOLESTEROL/HDL RATIO MEASUREMENT: 3.5 RATIO — SIGNIFICANT CHANGE UP (ref 3.4–9.6)
TRIGL SERPL-MCNC: 119 MG/DL — SIGNIFICANT CHANGE UP (ref 10–149)
WBC # BLD: 5.83 K/UL — SIGNIFICANT CHANGE UP (ref 3.8–10.5)
WBC # FLD AUTO: 5.83 K/UL — SIGNIFICANT CHANGE UP (ref 3.8–10.5)

## 2019-06-19 DIAGNOSIS — E11.9 TYPE 2 DIABETES MELLITUS WITHOUT COMPLICATIONS: ICD-10-CM

## 2019-06-19 DIAGNOSIS — D64.9 ANEMIA, UNSPECIFIED: ICD-10-CM

## 2019-06-19 DIAGNOSIS — F17.200 NICOTINE DEPENDENCE, UNSPECIFIED, UNCOMPLICATED: ICD-10-CM

## 2019-06-19 NOTE — HISTORY OF PRESENT ILLNESS
[Spouse] : spouse [de-identified] : 56 yo male with history of DM2, HTN, anoxic brain injury 2/2 hypoglycemic episode,  HLD present for CPE. \par \par Patient states he has been feeling well, currently without complaints. He does not check BP at home. He does not check his FS. Has been eating relatively healthy diet with low carbohydrates. Does not exercise. Still smokes about 1 ppd according to the wife. He does not like to use the nicotine patch. He states he enjoy smoking and currently does not want to quit. He has loss about 13lbs over the past year, mostly through diet control. \par

## 2019-06-19 NOTE — REVIEW OF SYSTEMS
[Fever] : no fever [Chills] : no chills [Chest Pain] : no chest pain [Palpitations] : no palpitations [Shortness Of Breath] : no shortness of breath [Wheezing] : no wheezing [Cough] : no cough [Dyspnea on Exertion] : not dyspnea on exertion [Abdominal Pain] : no abdominal pain [Diarrhea] : no diarrhea [Constipation] : no constipation [Vomiting] : no vomiting [Dysuria] : no dysuria [Nocturia] : no nocturia [Frequency] : no frequency [Joint Pain] : no joint pain [Muscle Pain] : no muscle pain [Back Pain] : no back pain [Muscle Weakness] : no muscle weakness [Skin Rash] : no skin rash [Itching] : no itching [Anxiety] : no anxiety [Headache] : no headache [Memory Loss] : no memory loss [Depression] : no depression

## 2019-06-19 NOTE — HEALTH RISK ASSESSMENT
[Good] : ~his/her~  mood as  good [30 or more] : 30 or more [No falls in past year] : Patient reported no falls in the past year [0] : 2) Feeling down, depressed, or hopeless: Not at all (0) [Patient reported colonoscopy was normal] : Patient reported colonoscopy was normal [Fully functional (bathing, dressing, toileting, transferring, walking, feeding)] : Fully functional (bathing, dressing, toileting, transferring, walking, feeding) [Fully functional (using the telephone, shopping, preparing meals, housekeeping, doing laundry, using] : Fully functional and needs no help or supervision to perform IADLs (using the telephone, shopping, preparing meals, housekeeping, doing laundry, using transportation, managing medications and managing finances) [] : No [de-identified] : not much [de-identified] : low carbohydrates [[id number]] : 0 [Reports changes in hearing] : Reports no changes in hearing [Reports changes in vision] : Reports no changes in vision [ColonoscopyDate] : 7/2017

## 2019-06-19 NOTE — ASSESSMENT
[FreeTextEntry1] : Mr. Matias is a 58 yo male with history of DM2, HTN, anoxic brain injury 2/2 hypoglycemic episode 5 years ago, HLD present for follow up visit. \par \par #DM\par -Check A1c, urine microalbumin\par -Continue with current regimen metformin 1g BID\par -C/w lifestyle and diet modification\par -Has upcoming opthalmology appointment \par \par #HTN\par -BP well controlled\par -C/w current regimen\par \par #Nicotine Dependence\par -Patient reluctant to quit smoking despite multiple counseling session. \par -Will get low dose CT chest to lung cancer screening given > 30 pack year\par \par #Normocytic anemia\par -Mildly anemic for past few years, previous negative iron study, B12, Folate, possibley AoCD\par -Repeat CBC for now. \par \par #HCM\par -Tdap today\par -Colonoscopy UTD in 2017 - 5mm polyp in ascending colon - negative for malignancy - repeat in 10 years\par -RTC in 3 months for diabetic followup\par \par D/w Dr. Arroyo\par \par He Dwight - PGY2\par

## 2019-06-25 ENCOUNTER — APPOINTMENT (OUTPATIENT)
Dept: OTOLARYNGOLOGY | Facility: CLINIC | Age: 59
End: 2019-06-25
Payer: MEDICARE

## 2019-06-25 VITALS
DIASTOLIC BLOOD PRESSURE: 85 MMHG | HEIGHT: 70.5 IN | WEIGHT: 184 LBS | BODY MASS INDEX: 26.05 KG/M2 | SYSTOLIC BLOOD PRESSURE: 181 MMHG | HEART RATE: 67 BPM

## 2019-06-25 DIAGNOSIS — H90.3 SENSORINEURAL HEARING LOSS, BILATERAL: ICD-10-CM

## 2019-06-25 PROCEDURE — 31575 DIAGNOSTIC LARYNGOSCOPY: CPT

## 2019-06-25 PROCEDURE — 99214 OFFICE O/P EST MOD 30 MIN: CPT | Mod: 25

## 2019-06-25 NOTE — ASSESSMENT
[FreeTextEntry1] : Patient follows up wiping she has a lax vaginal laxity restarted smoking 3 years ago fiberoptic evaluation his larynx is perfectly normal both patient and the wife was reassured patient was told the importance of stop smoking once again will follow up with us as needed.

## 2019-06-25 NOTE — PHYSICAL EXAM
[Nasal Endoscopy Performed] : nasal endoscopy was performed, see procedure section for findings [] : septum deviated to the right [Midline] : trachea located in midline position [Normal] : no neck adenopathy

## 2019-06-25 NOTE — HISTORY OF PRESENT ILLNESS
[de-identified] : Ayde has had minor ear clogging bilaterally and feel slike he needs an ear cleaning. he does not have any pain in the ears or changes in hearing. he does nto have any ringing int he ears or dizziness. He does not have any nasal congestion or runny nose right now but he does have minor nasal obstruction due to  the season changes. He plascencia smoke daily cigarettes and has had some minor changes in the voice. He has rare and occasional coguhing

## 2019-08-16 ENCOUNTER — OTHER (OUTPATIENT)
Age: 59
End: 2019-08-16

## 2019-10-30 ENCOUNTER — APPOINTMENT (OUTPATIENT)
Dept: INTERNAL MEDICINE | Facility: CLINIC | Age: 59
End: 2019-10-30

## 2019-10-30 ENCOUNTER — RESULT CHARGE (OUTPATIENT)
Age: 59
End: 2019-10-30

## 2019-10-30 VITALS
WEIGHT: 186 LBS | DIASTOLIC BLOOD PRESSURE: 80 MMHG | BODY MASS INDEX: 26.33 KG/M2 | SYSTOLIC BLOOD PRESSURE: 130 MMHG | HEIGHT: 70.5 IN

## 2019-10-30 LAB — HBA1C MFR BLD HPLC: 7.5

## 2019-10-30 RX ORDER — FLUTICASONE PROPIONATE 50 UG/1
50 SPRAY, METERED NASAL TWICE DAILY
Qty: 3 | Refills: 3 | Status: DISCONTINUED | COMMUNITY
Start: 2018-08-16 | End: 2019-10-30

## 2019-10-30 NOTE — PHYSICAL EXAM
[No Acute Distress] : no acute distress [Well Nourished] : well nourished [Normal Sclera/Conjunctiva] : normal sclera/conjunctiva [PERRL] : pupils equal round and reactive to light [No JVD] : no jugular venous distention [Supple] : supple [No Respiratory Distress] : no respiratory distress  [Clear to Auscultation] : lungs were clear to auscultation bilaterally [Normal Rate] : normal rate  [Regular Rhythm] : with a regular rhythm [Normal S1, S2] : normal S1 and S2 [Soft] : abdomen soft [Non Tender] : non-tender [Non-distended] : non-distended [No Joint Swelling] : no joint swelling [Grossly Normal Strength/Tone] : grossly normal strength/tone [No Rash] : no rash [No Skin Lesions] : no skin lesions [Coordination Grossly Intact] : coordination grossly intact [Normal Gait] : normal gait [Normal Affect] : the affect was normal [Normal Mood] : the mood was normal

## 2019-11-01 NOTE — REVIEW OF SYSTEMS
[Fever] : no fever [Chills] : no chills [Chest Pain] : no chest pain [Palpitations] : no palpitations [Lower Ext Edema] : no lower extremity edema [Shortness Of Breath] : no shortness of breath [Cough] : no cough [Abdominal Pain] : no abdominal pain [Nausea] : no nausea [Constipation] : no constipation [Vomiting] : no vomiting [Joint Pain] : no joint pain [Back Pain] : no back pain [Itching] : no itching [Skin Rash] : no skin rash [Headache] : no headache [Dizziness] : no dizziness

## 2019-11-01 NOTE — END OF VISIT
[] : Resident [FreeTextEntry3] : 59 current smoking man here for follow up visit.  No complaints today. \par FS  PM 190s. Diet described above. \par Having problems quitting with smoking but not interested. \par \par A/P\par A1C 7.5. C/w current plan. Counseled on strict diet.\par Nicotine Dependence- Not interested in quitting. \par Optho and Podiatry referral given.\par

## 2019-11-01 NOTE — HISTORY OF PRESENT ILLNESS
[de-identified] : 58 yo male with history of DM2, HTN, anoxic brain injury 2/2 hypoglycemic episode, HLD present for follow up visit. \par \par Patient continued to smoke about 1/2 to 1ppd. Currently does not want to quit smoking and does not want to start on medication/patches. \par \par He check his FS once/daily, normally 130 in AM, and 190 at PM\par Diet: eats relative healthy but has been eating more food. No exercise. \par Does not check BP at home. \par No other complaints currently

## 2019-11-01 NOTE — ASSESSMENT
[FreeTextEntry1] : #HCM\par -Prevnar and flu shot today\par -colonoscopy in 2017\par -F/u in 3 months\par \par D/w Dr. Cortez\par \par He Dwight - PGY3\par [employer] - [employer]

## 2019-12-11 ENCOUNTER — APPOINTMENT (OUTPATIENT)
Dept: PODIATRY | Facility: HOSPITAL | Age: 59
End: 2019-12-11

## 2019-12-13 ENCOUNTER — APPOINTMENT (OUTPATIENT)
Dept: OPHTHALMOLOGY | Facility: CLINIC | Age: 59
End: 2019-12-13

## 2020-03-31 ENCOUNTER — RX RENEWAL (OUTPATIENT)
Age: 60
End: 2020-03-31

## 2020-04-13 ENCOUNTER — RX RENEWAL (OUTPATIENT)
Age: 60
End: 2020-04-13

## 2020-05-04 ENCOUNTER — APPOINTMENT (OUTPATIENT)
Dept: INTERNAL MEDICINE | Facility: CLINIC | Age: 60
End: 2020-05-04

## 2020-05-04 ENCOUNTER — OUTPATIENT (OUTPATIENT)
Dept: OUTPATIENT SERVICES | Facility: HOSPITAL | Age: 60
LOS: 1 days | End: 2020-05-04
Payer: MEDICARE

## 2020-05-04 ENCOUNTER — RX RENEWAL (OUTPATIENT)
Age: 60
End: 2020-05-04

## 2020-05-04 DIAGNOSIS — Z93.1 GASTROSTOMY STATUS: Chronic | ICD-10-CM

## 2020-05-04 DIAGNOSIS — T14.8XXA OTHER INJURY OF UNSPECIFIED BODY REGION, INITIAL ENCOUNTER: ICD-10-CM

## 2020-05-04 DIAGNOSIS — S90.129A CONTUSION OF UNSPECIFIED LESSER TOE(S) WITHOUT DAMAGE TO NAIL, INITIAL ENCOUNTER: ICD-10-CM

## 2020-05-04 DIAGNOSIS — Z98.890 OTHER SPECIFIED POSTPROCEDURAL STATES: Chronic | ICD-10-CM

## 2020-05-04 DIAGNOSIS — I10 ESSENTIAL (PRIMARY) HYPERTENSION: ICD-10-CM

## 2020-05-04 DIAGNOSIS — E11.9 TYPE 2 DIABETES MELLITUS WITHOUT COMPLICATIONS: ICD-10-CM

## 2020-05-04 DIAGNOSIS — L02.31 CUTANEOUS ABSCESS OF BUTTOCK: Chronic | ICD-10-CM

## 2020-05-04 PROCEDURE — G0463: CPT

## 2020-06-01 ENCOUNTER — APPOINTMENT (OUTPATIENT)
Dept: INTERNAL MEDICINE | Facility: CLINIC | Age: 60
End: 2020-06-01

## 2020-06-08 ENCOUNTER — RX RENEWAL (OUTPATIENT)
Age: 60
End: 2020-06-08

## 2020-06-11 ENCOUNTER — RX RENEWAL (OUTPATIENT)
Age: 60
End: 2020-06-11

## 2020-06-25 ENCOUNTER — RX RENEWAL (OUTPATIENT)
Age: 60
End: 2020-06-25

## 2020-08-13 ENCOUNTER — RX RENEWAL (OUTPATIENT)
Age: 60
End: 2020-08-13

## 2020-10-13 ENCOUNTER — EMERGENCY (EMERGENCY)
Facility: HOSPITAL | Age: 60
LOS: 0 days | Discharge: HOME | End: 2020-10-13
Attending: STUDENT IN AN ORGANIZED HEALTH CARE EDUCATION/TRAINING PROGRAM
Payer: MEDICARE

## 2020-10-13 VITALS
OXYGEN SATURATION: 100 % | DIASTOLIC BLOOD PRESSURE: 86 MMHG | RESPIRATION RATE: 20 BRPM | TEMPERATURE: 98 F | SYSTOLIC BLOOD PRESSURE: 200 MMHG | WEIGHT: 184.97 LBS | HEIGHT: 72 IN | HEART RATE: 72 BPM
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
## 2020-10-13 DIAGNOSIS — S61.215A LACERATION WITHOUT FOREIGN BODY OF LEFT RING FINGER WITHOUT DAMAGE TO NAIL, INITIAL ENCOUNTER: ICD-10-CM

## 2020-10-13 DIAGNOSIS — E11.9 TYPE 2 DIABETES MELLITUS WITHOUT COMPLICATIONS: ICD-10-CM
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
## 2020-10-13 DIAGNOSIS — L02.31 CUTANEOUS ABSCESS OF BUTTOCK: Chronic | ICD-10-CM

## 2020-10-13 DIAGNOSIS — I10 ESSENTIAL (PRIMARY) HYPERTENSION: ICD-10-CM

## 2020-10-13 DIAGNOSIS — Z98.890 OTHER SPECIFIED POSTPROCEDURAL STATES: Chronic | ICD-10-CM

## 2020-10-13 DIAGNOSIS — Z79.84 LONG TERM (CURRENT) USE OF ORAL HYPOGLYCEMIC DRUGS: ICD-10-CM

## 2020-10-13 DIAGNOSIS — Z93.1 GASTROSTOMY STATUS: Chronic | ICD-10-CM

## 2020-10-13 PROCEDURE — 99283 EMERGENCY DEPT VISIT LOW MDM: CPT

## 2020-10-13 NOTE — ED ADULT TRIAGE NOTE - CHIEF COMPLAINT QUOTE
Pt with left 4th digit laceration from saturday to the tip of the finger from can, pt had tetanus vaccine last year Pt with left 4th digit laceration from saturday to the tip of the finger from can, pt had tetanus vaccine last year. Pt did not take his bp meds, he has it on him

## 2020-10-13 NOTE — ED ADULT NURSE NOTE - OBJECTIVE STATEMENT
Pt received in bed alert and oriented with female . Pt sustained a lac on left 4th digit while opening a can on Saturday. No bleeding noted as of this time. Wound appears moist but clean.  Pt is being evaluated by ER MD. Nursing ongoing and safety maintained.

## 2020-10-13 NOTE — ED PROVIDER NOTE - CLINICAL SUMMARY MEDICAL DECISION MAKING FREE TEXT BOX
59yo M w/ PMH HTN, DM, anoxic brain injury pw L 4th finger distal skin avulsion s/p opening can 4 days ago. AFVSS. Pt well appearing, in NAD, finger as described in exam. No active bleeding in ED. Tetanus UTD. Pt outside window (>72hrs) for closure w/ sutures / dermabond. Will dress w/ steri-strips. Pt stable for d/c home. Discussed wound care instructions w/ pt and fiance. Pt instructed for outpt f/u w/ PCP, pt w/ scheduled appt for 11/18. Return signs and symptoms d/w pt and fiance. They understand and agree w/ this plan.

## 2020-10-13 NOTE — ED PROVIDER NOTE - PATIENT PORTAL LINK FT
You can access the FollowMyHealth Patient Portal offered by [facility] by registering at the following website: [email or web address]. By joining [employer]’s FollowMyHealth portal, you will also be able to view your health information using other applications (apps) compatible with our system.

## 2020-10-13 NOTE — ED ADULT NURSE NOTE - PSH
H/O arthroscopy of right knee  2000  Left buttock abscess  I&D, 2008 - MRSA  S/P percutaneous endoscopic gastrostomy (PEG) tube placement  2009, s/p anoxic brain injury, diabetic coma

## 2020-10-13 NOTE — ED PROVIDER NOTE - PHYSICAL EXAMINATION
GEN: Awake, alert, interactive, NAD.  HEAD AND NECK: NC/AT. Airway patent. Neck supple.   EYES:  Clear b/l. EOMI. PERRL.   ENT: Moist mucus membranes.   CARDIAC: Regular rate, regular rhythm. No evident pedal edema.    RESP/CHEST: Normal respiratory effort with no use of accessory muscles or retractions. Clear throughout on auscultation.  ABD: soft, non-distended, non-tender. No rebound, no guarding.   BACK: No midline spinal TTP. No CVAT.   EXTREMITIES: Moving all extremities with no apparent deformities.   SKIN: L 4th digit w/ avulsion / skin flap distal tip. FROM L hand, strength / sensation intact. No active bleeding in ED.   NEURO: AOx3, CN II-XII grossly intact, no focal deficits.   PSYCH: Appropriate mood and affect.

## 2020-10-13 NOTE — ED PROVIDER NOTE - OBJECTIVE STATEMENT
61yo M pw L 4th digit distal avulsion s/p opening can 4 days ago. Tetanus UTD (1 year ago) Pt presents today d/t bleeding last night, pt did not want to come to ED until this AM.    PMH DM, HTN, anoxic brain injury s/p diabetic coma, PSH none, NKDA, meds lisinopril, metformin, labetalol, ASA (not on AC) 61yo M w/ PMH HTN, DM pw L 4th digit distal avulsion s/p opening can 4 days ago. Tetanus UTD (1 year ago) Pt presents today d/t bleeding last night (s/p cleaning), pt did not want to come to ED until this AM. Pt luna reports s/p injury she cleaned finger and wrapped w/ bandaid, bleeding controlled until last night. Pt was concerned d/t re-bleeding. Pt is not on AC. Pt is R handed. ROS otherwise neg: Denies finger pain, F/C.     PMH DM, HTN, anoxic brain injury s/p diabetic coma, PSH none, NKDA, meds lisinopril, metformin, labetalol, ASA (not on AC)

## 2020-10-13 NOTE — ED ADULT NURSE NOTE - PMH
Anoxic brain injury  2009 secondary to hypoglycemic episode while on Insulin  Deviated nasal septum    Diabetes Mellitus  Type 2  Drug abuse in remission  since 2009  HTN    Hyperlipidemia    Leg ulcer, right, limited to breakdown of skin  07/2017  MRSA infection  Left buttock, 2008

## 2020-10-13 NOTE — ED PROVIDER NOTE - PROGRESS NOTE DETAILS
Pt eating meal, resting comfortably in ED bed, fiance at bedside. L 4th finger w/o active bleeding in ED. Dressed w/ steri-strips.

## 2020-10-13 NOTE — ED ADULT NURSE NOTE - CHIEF COMPLAINT QUOTE
Pt with left 4th digit laceration from saturday to the tip of the finger from can, pt had tetanus vaccine last year. Pt did not take his bp meds, he has it on him

## 2020-10-30 ENCOUNTER — NON-APPOINTMENT (OUTPATIENT)
Age: 60
End: 2020-10-30

## 2020-11-02 ENCOUNTER — APPOINTMENT (OUTPATIENT)
Dept: INTERNAL MEDICINE | Facility: CLINIC | Age: 60
End: 2020-11-02

## 2020-11-18 ENCOUNTER — LABORATORY RESULT (OUTPATIENT)
Age: 60
End: 2020-11-18

## 2020-11-18 ENCOUNTER — NON-APPOINTMENT (OUTPATIENT)
Age: 60
End: 2020-11-18

## 2020-11-18 ENCOUNTER — OUTPATIENT (OUTPATIENT)
Dept: OUTPATIENT SERVICES | Facility: HOSPITAL | Age: 60
LOS: 1 days | End: 2020-11-18
Payer: MEDICARE

## 2020-11-18 ENCOUNTER — RX RENEWAL (OUTPATIENT)
Age: 60
End: 2020-11-18

## 2020-11-18 ENCOUNTER — APPOINTMENT (OUTPATIENT)
Dept: INTERNAL MEDICINE | Facility: CLINIC | Age: 60
End: 2020-11-18
Payer: MEDICARE

## 2020-11-18 VITALS — HEIGHT: 70.5 IN | SYSTOLIC BLOOD PRESSURE: 160 MMHG | DIASTOLIC BLOOD PRESSURE: 90 MMHG

## 2020-11-18 DIAGNOSIS — F17.200 NICOTINE DEPENDENCE, UNSPECIFIED, UNCOMPLICATED: ICD-10-CM

## 2020-11-18 DIAGNOSIS — L02.31 CUTANEOUS ABSCESS OF BUTTOCK: Chronic | ICD-10-CM

## 2020-11-18 DIAGNOSIS — Z93.1 GASTROSTOMY STATUS: Chronic | ICD-10-CM

## 2020-11-18 DIAGNOSIS — Z23 ENCOUNTER FOR IMMUNIZATION: ICD-10-CM

## 2020-11-18 DIAGNOSIS — H61.23 IMPACTED CERUMEN, BILATERAL: ICD-10-CM

## 2020-11-18 DIAGNOSIS — Z98.890 OTHER SPECIFIED POSTPROCEDURAL STATES: Chronic | ICD-10-CM

## 2020-11-18 DIAGNOSIS — I10 ESSENTIAL (PRIMARY) HYPERTENSION: ICD-10-CM

## 2020-11-18 PROCEDURE — 85027 COMPLETE CBC AUTOMATED: CPT

## 2020-11-18 PROCEDURE — 90688 IIV4 VACCINE SPLT 0.5 ML IM: CPT

## 2020-11-18 PROCEDURE — G0008: CPT

## 2020-11-18 PROCEDURE — 80061 LIPID PANEL: CPT

## 2020-11-18 PROCEDURE — 99213 OFFICE O/P EST LOW 20 MIN: CPT | Mod: GE

## 2020-11-18 PROCEDURE — 82043 UR ALBUMIN QUANTITATIVE: CPT

## 2020-11-18 PROCEDURE — 83036 HEMOGLOBIN GLYCOSYLATED A1C: CPT

## 2020-11-18 PROCEDURE — 80053 COMPREHEN METABOLIC PANEL: CPT

## 2020-11-18 PROCEDURE — 84153 ASSAY OF PSA TOTAL: CPT

## 2020-11-18 PROCEDURE — G0463: CPT | Mod: 25

## 2020-11-19 LAB
A1C WITH ESTIMATED AVERAGE GLUCOSE RESULT: 7.7 % — HIGH (ref 4–5.6)
ALBUMIN SERPL ELPH-MCNC: 4.6 G/DL — SIGNIFICANT CHANGE UP (ref 3.3–5)
ALP SERPL-CCNC: 109 U/L — SIGNIFICANT CHANGE UP (ref 40–120)
ALT FLD-CCNC: 15 U/L — SIGNIFICANT CHANGE UP (ref 10–45)
ANION GAP SERPL CALC-SCNC: 16 MMOL/L — SIGNIFICANT CHANGE UP (ref 5–17)
AST SERPL-CCNC: 13 U/L — SIGNIFICANT CHANGE UP (ref 10–40)
BILIRUB SERPL-MCNC: 0.6 MG/DL — SIGNIFICANT CHANGE UP (ref 0.2–1.2)
BUN SERPL-MCNC: 23 MG/DL — SIGNIFICANT CHANGE UP (ref 7–23)
CALCIUM SERPL-MCNC: 10.5 MG/DL — SIGNIFICANT CHANGE UP (ref 8.4–10.5)
CHLORIDE SERPL-SCNC: 99 MMOL/L — SIGNIFICANT CHANGE UP (ref 96–108)
CHOLEST SERPL-MCNC: 184 MG/DL — SIGNIFICANT CHANGE UP
CO2 SERPL-SCNC: 28 MMOL/L — SIGNIFICANT CHANGE UP (ref 22–31)
CREAT ?TM UR-MCNC: 299 MG/DL — SIGNIFICANT CHANGE UP
CREAT SERPL-MCNC: 1.2 MG/DL — SIGNIFICANT CHANGE UP (ref 0.5–1.3)
ESTIMATED AVERAGE GLUCOSE: 174 MG/DL — HIGH (ref 68–114)
GLUCOSE SERPL-MCNC: 212 MG/DL — HIGH (ref 70–99)
HCT VFR BLD CALC: 40.6 % — SIGNIFICANT CHANGE UP (ref 39–50)
HDLC SERPL-MCNC: 46 MG/DL — SIGNIFICANT CHANGE UP
HGB BLD-MCNC: 12.7 G/DL — LOW (ref 13–17)
LIPID PNL WITH DIRECT LDL SERPL: 115 MG/DL — HIGH
MCHC RBC-ENTMCNC: 29 PG — SIGNIFICANT CHANGE UP (ref 27–34)
MCHC RBC-ENTMCNC: 31.3 GM/DL — LOW (ref 32–36)
MCV RBC AUTO: 92.7 FL — SIGNIFICANT CHANGE UP (ref 80–100)
MICROALBUMIN UR-MCNC: 258.5 MG/DL — SIGNIFICANT CHANGE UP
MICROALBUMIN/CREAT UR-RTO: 864 MG/G — HIGH (ref 0–30)
NON HDL CHOLESTEROL: 138 MG/DL — HIGH
PLATELET # BLD AUTO: 199 K/UL — SIGNIFICANT CHANGE UP (ref 150–400)
POTASSIUM SERPL-MCNC: 3.8 MMOL/L — SIGNIFICANT CHANGE UP (ref 3.5–5.3)
POTASSIUM SERPL-SCNC: 3.8 MMOL/L — SIGNIFICANT CHANGE UP (ref 3.5–5.3)
PROT SERPL-MCNC: 7.6 G/DL — SIGNIFICANT CHANGE UP (ref 6–8.3)
PSA SERPL-MCNC: 5.55 NG/ML — HIGH (ref 0–4)
RBC # BLD: 4.38 M/UL — SIGNIFICANT CHANGE UP (ref 4.2–5.8)
RBC # FLD: 13.1 % — SIGNIFICANT CHANGE UP (ref 10.3–14.5)
SODIUM SERPL-SCNC: 143 MMOL/L — SIGNIFICANT CHANGE UP (ref 135–145)
TRIGL SERPL-MCNC: 113 MG/DL — SIGNIFICANT CHANGE UP
WBC # BLD: 6.42 K/UL — SIGNIFICANT CHANGE UP (ref 3.8–10.5)
WBC # FLD AUTO: 6.42 K/UL — SIGNIFICANT CHANGE UP (ref 3.8–10.5)

## 2020-11-28 NOTE — END OF VISIT
[] : Resident [FreeTextEntry3] : few episodes of emesis last night, unclear etiology, currently resolved. No additional or residual sx, abd exam unremarkable today. Patient advised to call for any recurrence.

## 2020-11-28 NOTE — ASSESSMENT
[FreeTextEntry1] : #HCM\par Flu shot during visit; otherwise up to date\par EKG wnl \par cbc, cmp, a1c, urine, lipids, psa after discussion pros/cons of this screening method\par recent colonoscopy, lung ca screening\par RTC in 3 months as follow up, will call with lab results\par \par \par ole Best\par \par \par Douglas Wood, pgy 2

## 2020-11-28 NOTE — HEALTH RISK ASSESSMENT
[Good] : ~his/her~  mood as  good [] : Yes [30 or more] : 30 or more [No] : No [1 or 2 (0 pts)] : 1 or 2 (0 points) [Never (0 pts)] : Never (0 points) [No falls in past year] : Patient reported no falls in the past year [0] : 2) Feeling down, depressed, or hopeless: Not at all (0) [Patient reported colonoscopy was normal] : Patient reported colonoscopy was normal [Handling Complex Tasks] : difficulty handling complex tasks [Reasoning] : difficulty with reasoning [None] : None [With Significant Other] : lives with significant other [Unemployed] : unemployed [Feels Safe at Home] : Feels safe at home [Fully functional (bathing, dressing, toileting, transferring, walking, feeding)] : Fully functional (bathing, dressing, toileting, transferring, walking, feeding) [Fully functional (using the telephone, shopping, preparing meals, housekeeping, doing laundry, using] : Fully functional and needs no help or supervision to perform IADLs (using the telephone, shopping, preparing meals, housekeeping, doing laundry, using transportation, managing medications and managing finances) [de-identified] : walks >7 miles at a time  [Change in mental status noted] : No change in mental status noted [Reports changes in hearing] : Reports no changes in hearing [Reports changes in vision] : Reports no changes in vision [Reports changes in dental health] : Reports no changes in dental health [ColonoscopyComments] : 2017

## 2020-11-28 NOTE — REVIEW OF SYSTEMS
[Fever] : no fever [Chills] : no chills [Night Sweats] : no night sweats [Recent Change In Weight] : ~T no recent weight change [Pain] : no pain [Itching] : no itching [Earache] : no earache [Hearing Loss] : no hearing loss [Nasal Discharge] : no nasal discharge [Sore Throat] : no sore throat [Chest Pain] : no chest pain [Palpitations] : no palpitations [Orthopena] : no orthopnea [Paroxysmal Nocturnal Dyspnea] : no paroxysmal nocturnal dyspnea [Shortness Of Breath] : no shortness of breath [Wheezing] : no wheezing [Cough] : no cough [Abdominal Pain] : no abdominal pain [Nausea] : no nausea [Vomiting] : no vomiting [Heartburn] : no heartburn [Dysuria] : no dysuria [Incontinence] : no incontinence [Hematuria] : no hematuria [Frequency] : no frequency [Joint Pain] : no joint pain [Back Pain] : no back pain [Headache] : no headache [Dizziness] : no dizziness [Memory Loss] : no memory loss

## 2020-11-28 NOTE — PHYSICAL EXAM
[No Acute Distress] : no acute distress [Well Nourished] : well nourished [Well Developed] : well developed [Well-Appearing] : well-appearing [Normal Sclera/Conjunctiva] : normal sclera/conjunctiva [PERRL] : pupils equal round and reactive to light [EOMI] : extraocular movements intact [Normal Outer Ear/Nose] : the outer ears and nose were normal in appearance [Normal Oropharynx] : the oropharynx was normal [No JVD] : no jugular venous distention [No Lymphadenopathy] : no lymphadenopathy [Supple] : supple [No Respiratory Distress] : no respiratory distress  [No Accessory Muscle Use] : no accessory muscle use [Clear to Auscultation] : lungs were clear to auscultation bilaterally [Normal Rate] : normal rate  [Regular Rhythm] : with a regular rhythm [Normal S1, S2] : normal S1 and S2 [No Murmur] : no murmur heard [No Abdominal Bruit] : a ~M bruit was not heard ~T in the abdomen [No Edema] : there was no peripheral edema [No Palpable Aorta] : no palpable aorta [Soft] : abdomen soft [Non Tender] : non-tender [Non-distended] : non-distended [No HSM] : no HSM [Normal Bowel Sounds] : normal bowel sounds [Normal Posterior Cervical Nodes] : no posterior cervical lymphadenopathy [Normal Anterior Cervical Nodes] : no anterior cervical lymphadenopathy [No CVA Tenderness] : no CVA  tenderness [No Spinal Tenderness] : no spinal tenderness [No Joint Swelling] : no joint swelling [Grossly Normal Strength/Tone] : grossly normal strength/tone [No Rash] : no rash [Coordination Grossly Intact] : coordination grossly intact [de-identified] : some extra cardiac beats noted

## 2020-11-28 NOTE — HISTORY OF PRESENT ILLNESS
[FreeTextEntry1] : CPE [de-identified] : 59 yo male with history of DM2, HTN, anoxic brain injury 2/2 hypoglycemic episode, HLD present for follow up visit. The following items were discussed. Much of history given with his fiance in the room\par \par DM: Blood sugars have been well managed, typically in the 200's before given his medications but gradually go down throughout the day. No episodes of hypoglycemia, has tried to watch what he;s been eating. \par \par HTN: Does not check bp's at home, has not changed medications for a bit. \par \par Hx of anoxic brain injury: Functions with adl's though relies on fiance quite a bit\par \par Vomiting: Had few episodes of nausea/vomiting, may be have been related to smoking. Currently no symptoms, no fevers, chills, nausea, vomiting or decrease in appetite. \par \par Smoking: In general has quit though still smokes occiasionally. >30 pack year smoking history.

## 2020-12-01 DIAGNOSIS — E78.00 PURE HYPERCHOLESTEROLEMIA, UNSPECIFIED: ICD-10-CM

## 2020-12-01 DIAGNOSIS — H61.23 IMPACTED CERUMEN, BILATERAL: ICD-10-CM

## 2020-12-01 DIAGNOSIS — E11.9 TYPE 2 DIABETES MELLITUS WITHOUT COMPLICATIONS: ICD-10-CM

## 2020-12-01 DIAGNOSIS — G93.1 ANOXIC BRAIN DAMAGE, NOT ELSEWHERE CLASSIFIED: ICD-10-CM

## 2020-12-01 DIAGNOSIS — F17.200 NICOTINE DEPENDENCE, UNSPECIFIED, UNCOMPLICATED: ICD-10-CM

## 2020-12-01 DIAGNOSIS — Z23 ENCOUNTER FOR IMMUNIZATION: ICD-10-CM

## 2020-12-10 ENCOUNTER — NON-APPOINTMENT (OUTPATIENT)
Age: 60
End: 2020-12-10

## 2020-12-10 VITALS — BODY MASS INDEX: 26.76 KG/M2 | HEIGHT: 70.5 IN | WEIGHT: 189 LBS

## 2020-12-10 NOTE — PLAN
[Smoking Cessation] : smoking cessation [Age appropriate screenings] : Age appropriate screenings [Regular follow-up with healthcare provider] : regular follow-up with healthcare provider [FreeTextEntry1] : His LDCT is scheduled for 12/13/20 at the [facility] location.  He will follow up with Dr. Wood for the results.

## 2020-12-10 NOTE — HISTORY OF PRESENT ILLNESS
[Former] : former smoker [_____ pack-years] : [unfilled] pack-years [TextBox_13] : He denies hemoptysis, denies new cough, denies unexplained weight loss.\par He is a former smoker with a 31 pack year smoking history (0.75PPD x 41 years). He quit 3 weeks ago.  He denies family h/o lung cancer.  He is referred by Dr. Douglas Wood.

## 2020-12-10 NOTE — ASSESSMENT
[Discussed Cessation Strategies] : cessation strategies were discussed [Ready] : Patient is ready for cessation intervention [Action] : Action: The patient is actively trying to quit smoking or has quit smoking in the past 6 months

## 2020-12-13 ENCOUNTER — RESULT REVIEW (OUTPATIENT)
Age: 60
End: 2020-12-13

## 2020-12-13 ENCOUNTER — OUTPATIENT (OUTPATIENT)
Dept: OUTPATIENT SERVICES | Facility: HOSPITAL | Age: 60
LOS: 1 days | End: 2020-12-13
Payer: MEDICARE

## 2020-12-13 ENCOUNTER — APPOINTMENT (OUTPATIENT)
Dept: CT IMAGING | Facility: IMAGING CENTER | Age: 60
End: 2020-12-13
Payer: MEDICARE

## 2020-12-13 DIAGNOSIS — Z93.1 GASTROSTOMY STATUS: Chronic | ICD-10-CM

## 2020-12-13 DIAGNOSIS — L02.31 CUTANEOUS ABSCESS OF BUTTOCK: Chronic | ICD-10-CM

## 2020-12-13 DIAGNOSIS — R05 COUGH: ICD-10-CM

## 2020-12-13 DIAGNOSIS — Z98.890 OTHER SPECIFIED POSTPROCEDURAL STATES: Chronic | ICD-10-CM

## 2020-12-13 PROCEDURE — G0297: CPT | Mod: 26

## 2020-12-13 PROCEDURE — 71271 CT THORAX LUNG CANCER SCR C-: CPT

## 2021-01-04 ENCOUNTER — APPOINTMENT (OUTPATIENT)
Dept: UROLOGY | Facility: CLINIC | Age: 61
End: 2021-01-04
Payer: MEDICARE

## 2021-01-04 VITALS
DIASTOLIC BLOOD PRESSURE: 105 MMHG | HEIGHT: 70.5 IN | RESPIRATION RATE: 17 BRPM | TEMPERATURE: 98 F | WEIGHT: 189 LBS | HEART RATE: 69 BPM | BODY MASS INDEX: 26.76 KG/M2 | SYSTOLIC BLOOD PRESSURE: 224 MMHG

## 2021-01-04 PROCEDURE — 99203 OFFICE O/P NEW LOW 30 MIN: CPT

## 2021-01-04 NOTE — LETTER BODY
[Dear  ___] : Dear  [unfilled], [( Thank you for referring [unfilled] for consultation for _____ )] : Thank you for referring [unfilled] for consultation for [unfilled] [FreeTextEntry1] : He is at higher risk due to smoking history.\par We will repeat the PSA with free and total.\par We were not able to do labwork today since patient did not take anti-hypertensive medications this morning, and his systolic BP remained >200.\par We urged patient to goto urgent care or ER.\par The fiance took patient home to take anti-HTN meds.  She will repeat measurements afterwards and consider urgent care. \par

## 2021-01-04 NOTE — PHYSICAL EXAM
[General Appearance - Well Developed] : well developed [General Appearance - Well Nourished] : well nourished [Normal Appearance] : normal appearance [Well Groomed] : well groomed [General Appearance - In No Acute Distress] : no acute distress [Edema] : no peripheral edema [Respiration, Rhythm And Depth] : normal respiratory rhythm and effort [Exaggerated Use Of Accessory Muscles For Inspiration] : no accessory muscle use [Abdomen Soft] : soft [Abdomen Tenderness] : non-tender [Costovertebral Angle Tenderness] : no ~M costovertebral angle tenderness [Urethral Meatus] : meatus normal [Urinary Bladder Findings] : the bladder was normal on palpation [Scrotum] : the scrotum was normal [Testes Mass (___cm)] : there were no testicular masses [Prostate Tenderness] : the prostate was not tender [No Prostate Nodules] : no prostate nodules [Prostate Size ___ gm] : prostate size [unfilled] gm [] : no rash [Mood] : the mood was normal [Not Anxious] : not anxious [No Palpable Adenopathy] : no palpable adenopathy [Supraclavicular Lymph Nodes Enlarged Bilaterally] : supraclavicular [Axillary Lymph Nodes Enlarged Bilaterally] : axillary [FreeTextEntry1] : Fisin answers most questions for patient

## 2021-01-04 NOTE — HISTORY OF PRESENT ILLNESS
[FreeTextEntry1] : 60 year old M w hx of anoxic brain injury 2/2 hypoglycemia and elevated PSa 5.55. As per patient fiance, this was his first PSA test.  He is a smoker, for at least 40 years at 1/3 ppd. He is trying to cut back. No family hx of prostate cancer.  \par \par Denies any voiding symptoms. \par \par Of note, patient did not take anti-htn meds this morning, due to not having breakfast due to early appointment. His SBP was persistently in 200's and we urged patient to goto urgent care.  They decided to go home and take his medication, repeat BP, and then get evaluated.

## 2021-01-04 NOTE — ASSESSMENT
[FreeTextEntry1] : Repeat PSA labwork - free and total\par This was deferred today due to hytertensive episode, they will return for this\par \par MRI prostate - will request authorization

## 2021-01-07 ENCOUNTER — OUTPATIENT (OUTPATIENT)
Dept: OUTPATIENT SERVICES | Facility: HOSPITAL | Age: 61
LOS: 1 days | End: 2021-01-07
Payer: MEDICARE

## 2021-01-07 ENCOUNTER — RESULT REVIEW (OUTPATIENT)
Age: 61
End: 2021-01-07

## 2021-01-07 ENCOUNTER — APPOINTMENT (OUTPATIENT)
Dept: MRI IMAGING | Facility: IMAGING CENTER | Age: 61
End: 2021-01-07
Payer: MEDICARE

## 2021-01-07 DIAGNOSIS — Z98.890 OTHER SPECIFIED POSTPROCEDURAL STATES: Chronic | ICD-10-CM

## 2021-01-07 DIAGNOSIS — R97.20 ELEVATED PROSTATE SPECIFIC ANTIGEN [PSA]: ICD-10-CM

## 2021-01-07 DIAGNOSIS — L02.31 CUTANEOUS ABSCESS OF BUTTOCK: Chronic | ICD-10-CM

## 2021-01-07 DIAGNOSIS — Z93.1 GASTROSTOMY STATUS: Chronic | ICD-10-CM

## 2021-01-07 PROCEDURE — 76377 3D RENDER W/INTRP POSTPROCES: CPT

## 2021-01-07 PROCEDURE — 76377 3D RENDER W/INTRP POSTPROCES: CPT | Mod: 26

## 2021-01-07 PROCEDURE — 72197 MRI PELVIS W/O & W/DYE: CPT | Mod: 26

## 2021-01-07 PROCEDURE — 72197 MRI PELVIS W/O & W/DYE: CPT

## 2021-01-21 ENCOUNTER — APPOINTMENT (OUTPATIENT)
Dept: PHYSICAL MEDICINE AND REHAB | Facility: CLINIC | Age: 61
End: 2021-01-21
Payer: MEDICARE

## 2021-01-21 VITALS
DIASTOLIC BLOOD PRESSURE: 104 MMHG | OXYGEN SATURATION: 98 % | SYSTOLIC BLOOD PRESSURE: 177 MMHG | TEMPERATURE: 97.34 F | HEART RATE: 72 BPM

## 2021-01-21 PROCEDURE — 99204 OFFICE O/P NEW MOD 45 MIN: CPT | Mod: GC

## 2021-01-21 NOTE — HISTORY OF PRESENT ILLNESS
[FreeTextEntry1] : GENARO PATEL is a 59yo M with PMHx of HTN, T2DM, and anoxic brain injury in 2009, who presents for initial evaluation for fatigue and debility. He arrived to clinic, accompanied by Sonia, his fiance, who provided much of history. Sonia has noticed diminished strength in the left upper and lower extremities, as well as fatigue over the past 1-2 months. She also reports 2 falls over the past 6 months, all of which were unwitnessed and occurred while walking outside. When asked, patient has poor recollection of event and was unable to provide much history. She has not noticed any difficulty ambulating or any near-fall incidents while patient is at home, but she feels he has a habit of shuffling his feet while walking. He currently ambulates independently without the use of assistive devices. Up until September, he was walking ~7 miles/day, but has since been less active due to the weather and COVID-19 pandemic.\par \par In addition to diminished strenght and fatigue, Sonia reports several incidents of unusual behavior since September 2020. Patient occasionally confused, would start packing his belongings all of a sudden, but unsure why and has at least one episode where he gets dressed as if there were plans to go out at night. Has had difficulties with cognition and memory since anoxic brain injury; per Sonia, no noticeable changes from baseline. Genaro currently receives no home services, but up until March, was working with someone at home to help with cognition and developing independent living skills; stopped due to pandemic. In addition, he has a HHA 30 hours/week, but receives no other services. \par \par Denied recent fever, chills, nausea, vomiting, CP, palpitations, HA, dizziness, lightheadedness, numbness, paresthesias, or weakness.

## 2021-01-21 NOTE — PHYSICAL EXAM
[FreeTextEntry1] : General: Alert. No acute distress. Accompanied by his fianceSonia. During encounter, Sonia provided much of history; patient did not answer any questions, unless prompted by examiner. \par Lung: Breathing is comfortable and regular.  \par Cardiovascular: Warm and well perfused.\par \par Neurologic: A&O x4 and able to follow simple commands. Immediate recall intact 3/3, but delayed recall impaired 1/2 with prompting. Concentration impaired, unable to spell the word 'WORLD" backwards. Naming intact and able to repeat "No ifs ands, or buts". DTRs diminished but symmetric. Neg Babinski bilaterally. Sensation to light touch and pinprick intact in bilateral LEs\par \par Strength: 5/5 in bilateral shoulder abduction, elbow flexion, elbow extension, wrist extension, handgrip, hip flexion, knee flexion, knee extension, dorsiflexion, and plantarflexion. Able to stand on toes and heels with careful prompting. During gait cycle, has good heel strike without evidence of foot drop, but diminished arm swing. \par No pronator drift, digit crossing intact and symmetric.\par \par Musculoskeletal: mildly diffuse rigidity in bilateral upper extremities. \par \par Functional status: patient ambulated (I) without AD with good HS noted (B), diminished arm swing (B)

## 2021-01-21 NOTE — ASSESSMENT
[FreeTextEntry1] : GENARO PATEL is a 59yo M with PMHx of HTN, T2DM, and anoxic brain injury in 2009, who presents for initial evaluation for fatigue and debility. He was accompanied by his fiance, Toya, who provided much of history. Based on patient's clinical history, patient likely has debility 2/2 less activity during pandemic and winter. Neurological exam was nonfocal and largely unchanged, as compared to exam from 2015, so less suspicious for neurologic condition, such as acute CVA. \par \par 1. recommend outpatient PT for gait training, strengthening, transfers, and conditioning\par 2. recommend follow up with a neurologist; Sonia was provided with name of neurologists whom Genaro can schedule appointments with. Based on clinical presentation, will defer head imaging and further testing to neurology.\par 3. return to clinic 4-6 weeks\par \par I spent a total of 45 minutes on the date of encounter evaluating and treating the patient including a discussion of treatment options with the patient's wife.

## 2021-01-21 NOTE — REVIEW OF SYSTEMS
[Patient Intake Form Reviewed] : Patient intake form was reviewed [Difficulty Walking] : difficulty walking [Negative] : Gastrointestinal [Fever] : no fever [Muscle Weakness] : no muscle weakness

## 2021-01-27 ENCOUNTER — LABORATORY RESULT (OUTPATIENT)
Age: 61
End: 2021-01-27

## 2021-01-27 ENCOUNTER — NON-APPOINTMENT (OUTPATIENT)
Age: 61
End: 2021-01-27

## 2021-01-27 ENCOUNTER — APPOINTMENT (OUTPATIENT)
Dept: INTERNAL MEDICINE | Facility: CLINIC | Age: 61
End: 2021-01-27
Payer: MEDICARE

## 2021-01-27 ENCOUNTER — OUTPATIENT (OUTPATIENT)
Dept: OUTPATIENT SERVICES | Facility: HOSPITAL | Age: 61
LOS: 1 days | End: 2021-01-27
Payer: MEDICARE

## 2021-01-27 VITALS
HEIGHT: 70.5 IN | BODY MASS INDEX: 24.63 KG/M2 | DIASTOLIC BLOOD PRESSURE: 90 MMHG | SYSTOLIC BLOOD PRESSURE: 162 MMHG | HEART RATE: 77 BPM | WEIGHT: 174 LBS | OXYGEN SATURATION: 98 %

## 2021-01-27 DIAGNOSIS — Z98.890 OTHER SPECIFIED POSTPROCEDURAL STATES: Chronic | ICD-10-CM

## 2021-01-27 DIAGNOSIS — Z93.1 GASTROSTOMY STATUS: Chronic | ICD-10-CM

## 2021-01-27 DIAGNOSIS — L02.31 CUTANEOUS ABSCESS OF BUTTOCK: Chronic | ICD-10-CM

## 2021-01-27 DIAGNOSIS — I10 ESSENTIAL (PRIMARY) HYPERTENSION: ICD-10-CM

## 2021-01-27 PROCEDURE — 99213 OFFICE O/P EST LOW 20 MIN: CPT | Mod: GE

## 2021-01-27 PROCEDURE — 83036 HEMOGLOBIN GLYCOSYLATED A1C: CPT

## 2021-01-27 PROCEDURE — G0463: CPT

## 2021-01-27 PROCEDURE — 80053 COMPREHEN METABOLIC PANEL: CPT

## 2021-01-27 PROCEDURE — 82607 VITAMIN B-12: CPT

## 2021-01-27 PROCEDURE — 82746 ASSAY OF FOLIC ACID SERUM: CPT

## 2021-01-27 PROCEDURE — 84443 ASSAY THYROID STIM HORMONE: CPT

## 2021-01-28 LAB
A1C WITH ESTIMATED AVERAGE GLUCOSE RESULT: 6.7 % — HIGH (ref 4–5.6)
ALBUMIN SERPL ELPH-MCNC: 4.4 G/DL — SIGNIFICANT CHANGE UP (ref 3.3–5)
ALP SERPL-CCNC: 85 U/L — SIGNIFICANT CHANGE UP (ref 40–120)
ALT FLD-CCNC: 13 U/L — SIGNIFICANT CHANGE UP (ref 10–45)
ANION GAP SERPL CALC-SCNC: 16 MMOL/L — SIGNIFICANT CHANGE UP (ref 5–17)
AST SERPL-CCNC: 15 U/L — SIGNIFICANT CHANGE UP (ref 10–40)
BILIRUB SERPL-MCNC: 0.5 MG/DL — SIGNIFICANT CHANGE UP (ref 0.2–1.2)
BUN SERPL-MCNC: 22 MG/DL — SIGNIFICANT CHANGE UP (ref 7–23)
CALCIUM SERPL-MCNC: 9.9 MG/DL — SIGNIFICANT CHANGE UP (ref 8.4–10.5)
CHLORIDE SERPL-SCNC: 100 MMOL/L — SIGNIFICANT CHANGE UP (ref 96–108)
CO2 SERPL-SCNC: 25 MMOL/L — SIGNIFICANT CHANGE UP (ref 22–31)
CREAT SERPL-MCNC: 1.35 MG/DL — HIGH (ref 0.5–1.3)
ESTIMATED AVERAGE GLUCOSE: 146 MG/DL — HIGH (ref 68–114)
FOLATE SERPL-MCNC: >20 NG/ML — SIGNIFICANT CHANGE UP
GLUCOSE SERPL-MCNC: 145 MG/DL — HIGH (ref 70–99)
POTASSIUM SERPL-MCNC: 4.1 MMOL/L — SIGNIFICANT CHANGE UP (ref 3.5–5.3)
POTASSIUM SERPL-SCNC: 4.1 MMOL/L — SIGNIFICANT CHANGE UP (ref 3.5–5.3)
PROT SERPL-MCNC: 6.9 G/DL — SIGNIFICANT CHANGE UP (ref 6–8.3)
SODIUM SERPL-SCNC: 142 MMOL/L — SIGNIFICANT CHANGE UP (ref 135–145)
T4 FREE+ TSH PNL SERPL: 1.28 UIU/ML — SIGNIFICANT CHANGE UP (ref 0.27–4.2)
VIT B12 SERPL-MCNC: 743 PG/ML — SIGNIFICANT CHANGE UP (ref 232–1245)

## 2021-02-01 NOTE — REVIEW OF SYSTEMS
[Unsteady Walk] : ataxia [Memory Loss] : memory loss [Fever] : no fever [Chills] : no chills [Night Sweats] : no night sweats [Recent Change In Weight] : ~T no recent weight change [Discharge] : no discharge [Pain] : no pain [Vision Problems] : no vision problems [Earache] : no earache [Nasal Discharge] : no nasal discharge [Chest Pain] : no chest pain [Palpitations] : no palpitations [Claudication] : no  leg claudication [Lower Ext Edema] : no lower extremity edema [Paroxysmal Nocturnal Dyspnea] : no paroxysmal nocturnal dyspnea [Shortness Of Breath] : no shortness of breath [Wheezing] : no wheezing [Cough] : no cough [Dyspnea on Exertion] : not dyspnea on exertion [Abdominal Pain] : no abdominal pain [Nausea] : no nausea [Vomiting] : no vomiting [Heartburn] : no heartburn [Dysuria] : no dysuria [Incontinence] : no incontinence [Hematuria] : no hematuria [Frequency] : no frequency [Joint Pain] : no joint pain [Back Pain] : no back pain [Headache] : no headache [Dizziness] : no dizziness [de-identified] : wide based gait

## 2021-02-01 NOTE — HISTORY OF PRESENT ILLNESS
[FreeTextEntry1] : Follow up  [de-identified] : 59 yo male with history of DM2, HTN, anoxic brain injury 2/2 hypoglycemic episode, HLD present for follow up visit. The following items were discussed. Much of history given with his fiance in the room. Per recent PMNR note: Sonia has noticed diminished strength in the left upper and lower extremities, as well as fatigue over the past 1-2 months. She also reports 2 falls over the past 6 months, all of which were unwitnessed and occurred while walking outside. Sonia has noticed diminished strength in the left upper and lower extremities, as well as fatigue over the past 1-2 months. She also reports 2 falls over the past 6 months, all of which were unwitnessed and occurred while walking outside. PMNR prescribed physical therapy 2x a week and neurology follow up. \par \par Seemed like his walking was not doing as well. some falls. Prescirbed some physical therapy. Sandra has not liked the way he was walking - calling it a shuffle. Could be because of covid? out of practice. Went out to walk wth the aid - she thought he was having trouble making it back. \par \par Blood sugars: have been stable. \par This morning the blood pressure at home was 130 systolic \par

## 2021-02-01 NOTE — ASSESSMENT
[FreeTextEntry1] : RTC in 2 weeks with bp checks or as needed\par follow up with neurology\par will call with lab levels\par \par \par \par ole Best\par \par \par Douglas Wood pgy 2

## 2021-02-01 NOTE — PHYSICAL EXAM
[No Acute Distress] : no acute distress [Well Nourished] : well nourished [Well Developed] : well developed [Normal Sclera/Conjunctiva] : normal sclera/conjunctiva [PERRL] : pupils equal round and reactive to light [EOMI] : extraocular movements intact [Normal Outer Ear/Nose] : the outer ears and nose were normal in appearance [Normal Oropharynx] : the oropharynx was normal [No JVD] : no jugular venous distention [No Respiratory Distress] : no respiratory distress  [No Accessory Muscle Use] : no accessory muscle use [Clear to Auscultation] : lungs were clear to auscultation bilaterally [Normal Rate] : normal rate  [Regular Rhythm] : with a regular rhythm [Normal S1, S2] : normal S1 and S2 [No Abdominal Bruit] : a ~M bruit was not heard ~T in the abdomen [No Varicosities] : no varicosities [No Extremity Clubbing/Cyanosis] : no extremity clubbing/cyanosis [Soft] : abdomen soft [Non Tender] : non-tender [No HSM] : no HSM [Normal Anterior Cervical Nodes] : no anterior cervical lymphadenopathy [No CVA Tenderness] : no CVA  tenderness [No Joint Swelling] : no joint swelling [No Focal Deficits] : no focal deficits [de-identified] : wide based gait, some left sided rigidity

## 2021-02-01 NOTE — HEALTH RISK ASSESSMENT
[] : Yes [30 or more] : 30 or more [No] : No [0] : 2) Feeling down, depressed, or hopeless: Not at all (0) [1 or 2 (0 pts)] : 1 or 2 (0 points) [Never (0 pts)] : Never (0 points) [Two or more falls in past year] : Patient reported two or more falls in the past year [de-identified] : walking

## 2021-02-02 DIAGNOSIS — E11.9 TYPE 2 DIABETES MELLITUS WITHOUT COMPLICATIONS: ICD-10-CM

## 2021-02-02 DIAGNOSIS — R97.20 ELEVATED PROSTATE SPECIFIC ANTIGEN [PSA]: ICD-10-CM

## 2021-02-02 DIAGNOSIS — Z12.2 ENCOUNTER FOR SCREENING FOR MALIGNANT NEOPLASM OF RESPIRATORY ORGANS: ICD-10-CM

## 2021-02-02 DIAGNOSIS — G93.1 ANOXIC BRAIN DAMAGE, NOT ELSEWHERE CLASSIFIED: ICD-10-CM

## 2021-02-17 ENCOUNTER — TRANSCRIPTION ENCOUNTER (OUTPATIENT)
Age: 61
End: 2021-02-17

## 2021-02-20 ENCOUNTER — RESULT REVIEW (OUTPATIENT)
Age: 61
End: 2021-02-20

## 2021-02-20 ENCOUNTER — APPOINTMENT (OUTPATIENT)
Dept: CT IMAGING | Facility: IMAGING CENTER | Age: 61
End: 2021-02-20
Payer: MEDICARE

## 2021-02-20 ENCOUNTER — OUTPATIENT (OUTPATIENT)
Dept: OUTPATIENT SERVICES | Facility: HOSPITAL | Age: 61
LOS: 1 days | End: 2021-02-20
Payer: MEDICARE

## 2021-02-20 DIAGNOSIS — G93.1 ANOXIC BRAIN DAMAGE, NOT ELSEWHERE CLASSIFIED: ICD-10-CM

## 2021-02-20 DIAGNOSIS — Z93.1 GASTROSTOMY STATUS: Chronic | ICD-10-CM

## 2021-02-20 DIAGNOSIS — L02.31 CUTANEOUS ABSCESS OF BUTTOCK: Chronic | ICD-10-CM

## 2021-02-20 DIAGNOSIS — Z98.890 OTHER SPECIFIED POSTPROCEDURAL STATES: Chronic | ICD-10-CM

## 2021-02-20 PROCEDURE — G1004: CPT

## 2021-02-20 PROCEDURE — 70450 CT HEAD/BRAIN W/O DYE: CPT

## 2021-02-20 PROCEDURE — 70450 CT HEAD/BRAIN W/O DYE: CPT | Mod: 26,ME

## 2021-02-25 ENCOUNTER — NON-APPOINTMENT (OUTPATIENT)
Age: 61
End: 2021-02-25

## 2021-03-08 ENCOUNTER — OUTPATIENT (OUTPATIENT)
Dept: OUTPATIENT SERVICES | Facility: HOSPITAL | Age: 61
LOS: 1 days | End: 2021-03-08
Payer: MEDICARE

## 2021-03-08 ENCOUNTER — APPOINTMENT (OUTPATIENT)
Dept: MRI IMAGING | Facility: IMAGING CENTER | Age: 61
End: 2021-03-08
Payer: MEDICARE

## 2021-03-08 ENCOUNTER — RESULT REVIEW (OUTPATIENT)
Age: 61
End: 2021-03-08

## 2021-03-08 DIAGNOSIS — Z93.1 GASTROSTOMY STATUS: Chronic | ICD-10-CM

## 2021-03-08 DIAGNOSIS — G91.9 HYDROCEPHALUS, UNSPECIFIED: ICD-10-CM

## 2021-03-08 DIAGNOSIS — L02.31 CUTANEOUS ABSCESS OF BUTTOCK: Chronic | ICD-10-CM

## 2021-03-08 DIAGNOSIS — Z98.890 OTHER SPECIFIED POSTPROCEDURAL STATES: Chronic | ICD-10-CM

## 2021-03-08 PROCEDURE — 70553 MRI BRAIN STEM W/O & W/DYE: CPT

## 2021-03-08 PROCEDURE — G1004: CPT

## 2021-03-08 PROCEDURE — A9585: CPT

## 2021-03-08 PROCEDURE — 70553 MRI BRAIN STEM W/O & W/DYE: CPT | Mod: 26,ME

## 2021-05-03 ENCOUNTER — APPOINTMENT (OUTPATIENT)
Dept: PHYSICAL MEDICINE AND REHAB | Facility: CLINIC | Age: 61
End: 2021-05-03

## 2021-09-21 ENCOUNTER — EMERGENCY (EMERGENCY)
Facility: HOSPITAL | Age: 61
LOS: 0 days | Discharge: HOME | End: 2021-09-21
Attending: EMERGENCY MEDICINE
Payer: MEDICARE

## 2021-09-21 VITALS
HEART RATE: 84 BPM | OXYGEN SATURATION: 96 % | TEMPERATURE: 99 F | SYSTOLIC BLOOD PRESSURE: 210 MMHG | DIASTOLIC BLOOD PRESSURE: 98 MMHG | HEIGHT: 72 IN | RESPIRATION RATE: 17 BRPM | WEIGHT: 179.9 LBS

## 2021-09-21 VITALS
HEART RATE: 86 BPM | OXYGEN SATURATION: 98 % | RESPIRATION RATE: 17 BRPM | TEMPERATURE: 98 F | SYSTOLIC BLOOD PRESSURE: 161 MMHG | DIASTOLIC BLOOD PRESSURE: 88 MMHG

## 2021-09-21 DIAGNOSIS — S09.90XA UNSPECIFIED INJURY OF HEAD, INITIAL ENCOUNTER: ICD-10-CM
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
## 2021-09-21 DIAGNOSIS — I10 ESSENTIAL (PRIMARY) HYPERTENSION: ICD-10-CM

## 2021-09-21 DIAGNOSIS — Z93.1 GASTROSTOMY STATUS: Chronic | ICD-10-CM

## 2021-09-21 DIAGNOSIS — S60.511A ABRASION OF RIGHT HAND, INITIAL ENCOUNTER: ICD-10-CM

## 2021-09-21 DIAGNOSIS — Z98.890 OTHER SPECIFIED POSTPROCEDURAL STATES: Chronic | ICD-10-CM

## 2021-09-21 DIAGNOSIS — Z87.2 PERSONAL HISTORY OF DISEASES OF THE SKIN AND SUBCUTANEOUS TISSUE: ICD-10-CM

## 2021-09-21 DIAGNOSIS — G93.1 ANOXIC BRAIN DAMAGE, NOT ELSEWHERE CLASSIFIED: ICD-10-CM

## 2021-09-21 DIAGNOSIS — E78.5 HYPERLIPIDEMIA, UNSPECIFIED: ICD-10-CM

## 2021-09-21 DIAGNOSIS — S00.81XA ABRASION OF OTHER PART OF HEAD, INITIAL ENCOUNTER: ICD-10-CM

## 2021-09-21 DIAGNOSIS — Z87.898 PERSONAL HISTORY OF OTHER SPECIFIED CONDITIONS: ICD-10-CM

## 2021-09-21 DIAGNOSIS — R41.82 ALTERED MENTAL STATUS, UNSPECIFIED: ICD-10-CM

## 2021-09-21 DIAGNOSIS — L02.31 CUTANEOUS ABSCESS OF BUTTOCK: Chronic | ICD-10-CM
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
## 2021-09-21 DIAGNOSIS — Z79.84 LONG TERM (CURRENT) USE OF ORAL HYPOGLYCEMIC DRUGS: ICD-10-CM

## 2021-09-21 DIAGNOSIS — Z87.09 PERSONAL HISTORY OF OTHER DISEASES OF THE RESPIRATORY SYSTEM: ICD-10-CM

## 2021-09-21 DIAGNOSIS — S90.411A ABRASION, RIGHT GREAT TOE, INITIAL ENCOUNTER: ICD-10-CM

## 2021-09-21 DIAGNOSIS — Z93.1 GASTROSTOMY STATUS: ICD-10-CM

## 2021-09-21 DIAGNOSIS — E11.9 TYPE 2 DIABETES MELLITUS WITHOUT COMPLICATIONS: ICD-10-CM

## 2021-09-21 DIAGNOSIS — Z86.14 PERSONAL HISTORY OF METHICILLIN RESISTANT STAPHYLOCOCCUS AUREUS INFECTION: ICD-10-CM

## 2021-09-21 DIAGNOSIS — Z98.890 OTHER SPECIFIED POSTPROCEDURAL STATES: ICD-10-CM
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
## 2021-09-21 DIAGNOSIS — Z79.82 LONG TERM (CURRENT) USE OF ASPIRIN: ICD-10-CM

## 2021-09-21 LAB
GLUCOSE BLDC GLUCOMTR-MCNC: 234 MG/DL — HIGH (ref 70–99)
GLUCOSE BLDC GLUCOMTR-MCNC: 327 MG/DL — HIGH (ref 70–99)

## 2021-09-21 PROCEDURE — 99284 EMERGENCY DEPT VISIT MOD MDM: CPT

## 2021-09-21 PROCEDURE — 70450 CT HEAD/BRAIN W/O DYE: CPT | Mod: 26,MA

## 2021-09-21 RX ORDER — AMLODIPINE BESYLATE 2.5 MG/1
10 TABLET ORAL ONCE
Refills: 0 | Status: COMPLETED | OUTPATIENT
Start: 2021-09-21 | End: 2021-09-21

## 2021-09-21 RX ORDER — INSULIN HUMAN 100 [IU]/ML
5 INJECTION, SOLUTION SUBCUTANEOUS ONCE
Refills: 0 | Status: DISCONTINUED | OUTPATIENT
Start: 2021-09-21 | End: 2021-09-21

## 2021-09-21 RX ORDER — LISINOPRIL 2.5 MG/1
40 TABLET ORAL ONCE
Refills: 0 | Status: COMPLETED | OUTPATIENT
Start: 2021-09-21 | End: 2021-09-21

## 2021-09-21 RX ADMIN — AMLODIPINE BESYLATE 10 MILLIGRAM(S): 2.5 TABLET ORAL at 16:15

## 2021-09-21 RX ADMIN — LISINOPRIL 40 MILLIGRAM(S): 2.5 TABLET ORAL at 16:15

## 2021-09-21 RX ADMIN — Medication 0.1 MILLIGRAM(S): at 19:20

## 2021-09-21 NOTE — ED PROVIDER NOTE - NSICDXPASTMEDICALHX_GEN_ALL_CORE_FT
PAST MEDICAL HISTORY:  Anoxic brain injury 2009 secondary to hypoglycemic episode while on Insulin    Deviated nasal septum     Diabetes Mellitus Type 2    Drug abuse in remission since 2009    HTN     Hyperlipidemia     Leg ulcer, right, limited to breakdown of skin 07/2017    MRSA infection Left buttock, 2008

## 2021-09-21 NOTE — ED PROVIDER NOTE - PATIENT PORTAL LINK FT
You can access the FollowMyHealth Patient Portal offered by [facility] by registering at the following website: [email or web address]. By joining [employer]’s FollowMyHealth portal, you will also be able to view your health information using other applications (apps) compatible with our system.

## 2021-09-21 NOTE — ED ADULT NURSE REASSESSMENT NOTE - NS ED NURSE REASSESS COMMENT FT1
Pt's significant other Elmira gave pt his home BP and diabetes meds listed in outpatient med review. Dr. Caraballo notified. Pt's significant other Elmira states that she gave pt his home BP and diabetes meds listed in outpatient med review. Dr. Caraballo notified.

## 2021-09-21 NOTE — ED ADULT NURSE NOTE - OBJECTIVE STATEMENT
Pt BIBA c/o fall Pt BIBEMS with abrasions to left side of face, right knee and right foot s/p witnessed fall on face at home. Pt has hx of hypoxic brain injury, unable to obtain further hx from pt at this time. Unknown LOC. Cardiac and spo2 monitoring in place. NSR on cardiac monitor. Noted that BP elevated in triage. Pt takes ASA 81 mg daily. NAD noted.

## 2021-09-21 NOTE — ED PROVIDER NOTE - OBJECTIVE STATEMENT
61 year old male w/PMH of anoxic brain injury, HTN, DM, HLD, baseline AAOx2 presents to the ED BIBA from home for witnessed fall onto face with abrasions to face, hands and feet. Pt currently denies any pain, dizziness, lightheadedness, CP, SOB or headache.

## 2021-09-21 NOTE — ED ADULT TRIAGE NOTE - CHIEF COMPLAINT QUOTE
pt a&O x2 baseline, biba from home alert  to self. hx of anoxic brain injury. pt unsteady agit walked out of house and had unwitnessed fall on face. multiple abrasions to left side of face  R great toe, r hand, knuckles. takes aspirin daily.  Hx htn , DM

## 2021-09-21 NOTE — ED PROVIDER NOTE - CLINICAL SUMMARY MEDICAL DECISION MAKING FREE TEXT BOX
abrasions of skin cleaned and dresssed, otherwise CT head clear without ICH, BP noted to be elevated, had only had 2 of 3 BP meds today, will give amlodipine and additional lisinopril.

## 2021-09-21 NOTE — ED PROVIDER NOTE - NSICDXPASTSURGICALHX_GEN_ALL_CORE_FT
PAST SURGICAL HISTORY:  H/O arthroscopy of right knee 2000    Left buttock abscess I&D, 2008 - MRSA    S/P percutaneous endoscopic gastrostomy (PEG) tube placement 2009, s/p anoxic brain injury, diabetic coma

## 2021-09-21 NOTE — ED PROVIDER NOTE - SKIN, MLM
Skin normal color for race, warm, dry. Superficial abrasions to left cheek, right great toe and right hand.

## 2021-09-22 ENCOUNTER — NON-APPOINTMENT (OUTPATIENT)
Age: 61
End: 2021-09-22

## 2021-09-26 ENCOUNTER — EMERGENCY (EMERGENCY)
Facility: HOSPITAL | Age: 61
LOS: 0 days | Discharge: HOME | End: 2021-09-26
Attending: STUDENT IN AN ORGANIZED HEALTH CARE EDUCATION/TRAINING PROGRAM
Payer: MEDICARE

## 2021-09-26 VITALS
OXYGEN SATURATION: 97 % | RESPIRATION RATE: 16 BRPM | TEMPERATURE: 98 F | WEIGHT: 184.97 LBS | SYSTOLIC BLOOD PRESSURE: 200 MMHG | HEART RATE: 69 BPM | DIASTOLIC BLOOD PRESSURE: 100 MMHG | HEIGHT: 72 IN

## 2021-09-26 VITALS
SYSTOLIC BLOOD PRESSURE: 164 MMHG | RESPIRATION RATE: 13 BRPM | HEART RATE: 72 BPM | DIASTOLIC BLOOD PRESSURE: 86 MMHG | OXYGEN SATURATION: 99 %

## 2021-09-26 DIAGNOSIS — Z93.1 GASTROSTOMY STATUS: Chronic | ICD-10-CM

## 2021-09-26 DIAGNOSIS — Z98.890 OTHER SPECIFIED POSTPROCEDURAL STATES: Chronic | ICD-10-CM

## 2021-09-26 DIAGNOSIS — Z87.2 PERSONAL HISTORY OF DISEASES OF THE SKIN AND SUBCUTANEOUS TISSUE: ICD-10-CM

## 2021-09-26 DIAGNOSIS — E78.5 HYPERLIPIDEMIA, UNSPECIFIED: ICD-10-CM

## 2021-09-26 DIAGNOSIS — I45.10 UNSPECIFIED RIGHT BUNDLE-BRANCH BLOCK: ICD-10-CM

## 2021-09-26 DIAGNOSIS — Z86.79 PERSONAL HISTORY OF OTHER DISEASES OF THE CIRCULATORY SYSTEM: ICD-10-CM

## 2021-09-26 DIAGNOSIS — E11.9 TYPE 2 DIABETES MELLITUS WITHOUT COMPLICATIONS: ICD-10-CM

## 2021-09-26 DIAGNOSIS — I10 ESSENTIAL (PRIMARY) HYPERTENSION: ICD-10-CM

## 2021-09-26 DIAGNOSIS — L02.31 CUTANEOUS ABSCESS OF BUTTOCK: Chronic | ICD-10-CM

## 2021-09-26 DIAGNOSIS — Z79.82 LONG TERM (CURRENT) USE OF ASPIRIN: ICD-10-CM

## 2021-09-26 DIAGNOSIS — Z79.84 LONG TERM (CURRENT) USE OF ORAL HYPOGLYCEMIC DRUGS: ICD-10-CM

## 2021-09-26 DIAGNOSIS — Z86.14 PERSONAL HISTORY OF METHICILLIN RESISTANT STAPHYLOCOCCUS AUREUS INFECTION: ICD-10-CM

## 2021-09-26 LAB
ALBUMIN SERPL ELPH-MCNC: 3.6 G/DL — SIGNIFICANT CHANGE UP (ref 3.3–5)
ALP SERPL-CCNC: 78 U/L — SIGNIFICANT CHANGE UP (ref 40–120)
ALT FLD-CCNC: 27 U/L — SIGNIFICANT CHANGE UP (ref 12–78)
ANION GAP SERPL CALC-SCNC: 8 MMOL/L — SIGNIFICANT CHANGE UP (ref 5–17)
AST SERPL-CCNC: 17 U/L — SIGNIFICANT CHANGE UP (ref 15–37)
BASOPHILS # BLD AUTO: 0.02 K/UL — SIGNIFICANT CHANGE UP (ref 0–0.2)
BASOPHILS NFR BLD AUTO: 0.4 % — SIGNIFICANT CHANGE UP (ref 0–2)
BILIRUB SERPL-MCNC: 0.9 MG/DL — SIGNIFICANT CHANGE UP (ref 0.2–1.2)
BUN SERPL-MCNC: 20 MG/DL — SIGNIFICANT CHANGE UP (ref 7–23)
CALCIUM SERPL-MCNC: 9.8 MG/DL — SIGNIFICANT CHANGE UP (ref 8.5–10.1)
CHLORIDE SERPL-SCNC: 100 MMOL/L — SIGNIFICANT CHANGE UP (ref 96–108)
CO2 SERPL-SCNC: 32 MMOL/L — HIGH (ref 22–31)
CREAT SERPL-MCNC: 1 MG/DL — SIGNIFICANT CHANGE UP (ref 0.5–1.3)
EOSINOPHIL # BLD AUTO: 0.05 K/UL — SIGNIFICANT CHANGE UP (ref 0–0.5)
EOSINOPHIL NFR BLD AUTO: 0.9 % — SIGNIFICANT CHANGE UP (ref 0–6)
GLUCOSE SERPL-MCNC: 217 MG/DL — HIGH (ref 70–99)
HCT VFR BLD CALC: 35.5 % — LOW (ref 39–50)
HGB BLD-MCNC: 11.8 G/DL — LOW (ref 13–17)
IMM GRANULOCYTES NFR BLD AUTO: 0.2 % — SIGNIFICANT CHANGE UP (ref 0–1.5)
LIDOCAIN IGE QN: 102 U/L — SIGNIFICANT CHANGE UP (ref 73–393)
LYMPHOCYTES # BLD AUTO: 1.26 K/UL — SIGNIFICANT CHANGE UP (ref 1–3.3)
LYMPHOCYTES # BLD AUTO: 23.2 % — SIGNIFICANT CHANGE UP (ref 13–44)
MCHC RBC-ENTMCNC: 28.9 PG — SIGNIFICANT CHANGE UP (ref 27–34)
MCHC RBC-ENTMCNC: 33.2 GM/DL — SIGNIFICANT CHANGE UP (ref 32–36)
MCV RBC AUTO: 87 FL — SIGNIFICANT CHANGE UP (ref 80–100)
MONOCYTES # BLD AUTO: 0.4 K/UL — SIGNIFICANT CHANGE UP (ref 0–0.9)
MONOCYTES NFR BLD AUTO: 7.4 % — SIGNIFICANT CHANGE UP (ref 2–14)
NEUTROPHILS # BLD AUTO: 3.68 K/UL — SIGNIFICANT CHANGE UP (ref 1.8–7.4)
NEUTROPHILS NFR BLD AUTO: 67.9 % — SIGNIFICANT CHANGE UP (ref 43–77)
NRBC # BLD: 0 /100 WBCS — SIGNIFICANT CHANGE UP (ref 0–0)
NT-PROBNP SERPL-SCNC: 348 PG/ML — HIGH (ref 0–125)
PLATELET # BLD AUTO: 190 K/UL — SIGNIFICANT CHANGE UP (ref 150–400)
POTASSIUM SERPL-MCNC: 3.3 MMOL/L — LOW (ref 3.5–5.3)
POTASSIUM SERPL-SCNC: 3.3 MMOL/L — LOW (ref 3.5–5.3)
PROT SERPL-MCNC: 7.6 GM/DL — SIGNIFICANT CHANGE UP (ref 6–8.3)
RBC # BLD: 4.08 M/UL — LOW (ref 4.2–5.8)
RBC # FLD: 12.7 % — SIGNIFICANT CHANGE UP (ref 10.3–14.5)
SODIUM SERPL-SCNC: 140 MMOL/L — SIGNIFICANT CHANGE UP (ref 135–145)
TROPONIN I SERPL-MCNC: <.015 NG/ML — SIGNIFICANT CHANGE UP (ref 0.01–0.04)
WBC # BLD: 5.42 K/UL — SIGNIFICANT CHANGE UP (ref 3.8–10.5)
WBC # FLD AUTO: 5.42 K/UL — SIGNIFICANT CHANGE UP (ref 3.8–10.5)

## 2021-09-26 PROCEDURE — 93010 ELECTROCARDIOGRAM REPORT: CPT

## 2021-09-26 PROCEDURE — 99285 EMERGENCY DEPT VISIT HI MDM: CPT

## 2021-09-26 RX ORDER — SODIUM CHLORIDE 9 MG/ML
1000 INJECTION INTRAMUSCULAR; INTRAVENOUS; SUBCUTANEOUS ONCE
Refills: 0 | Status: COMPLETED | OUTPATIENT
Start: 2021-09-26 | End: 2021-09-26

## 2021-09-26 RX ADMIN — SODIUM CHLORIDE 1000 MILLILITER(S): 9 INJECTION INTRAMUSCULAR; INTRAVENOUS; SUBCUTANEOUS at 03:58

## 2021-09-26 NOTE — ED ADULT TRIAGE NOTE - CHIEF COMPLAINT QUOTE
pt states "my blood pressure is jakob high." as per pt's wife BP at home as 200/118. hx TBI. normal mental status per wife

## 2021-09-26 NOTE — ED PROVIDER NOTE - PROGRESS NOTE DETAILS
labs unremarkable, trop negative, ekg negative. Patient to follow up with pmd for further evaluation of elevated BP. I have discussed with the patient about the ED workup, lab results, diagnostics results, plan for discharge home, need for follow-up with primary care physician/specialists, and return precautions. At this time, the patient does not require further workup in the ED. The patient is subjectively feeling better and would like to be discharged home. The patient had the opportunity to ask questions and I have answered all inquiries. The patient verbalizes understanding and agreement with the plan. The patient is hemodynamically stable, clinically well-appearing, ambulatory, mentating well and ready for discharge home.

## 2021-09-26 NOTE — ED PROVIDER NOTE - PATIENT PORTAL LINK FT
You can access the FollowMyHealth Patient Portal offered by [facility] by registering at the following website: [email or web address]. By joining [employer]’s FollowMyHealth portal, you will also be able to view your health information using other applications (apps) compatible with our system.

## 2021-09-26 NOTE — ED PROVIDER NOTE - NSFOLLOWUPINSTRUCTIONS_ED_ALL_ED_FT
Hypertension    Hypertension, commonly called high blood pressure, is when the force of blood pumping through your arteries is too strong. Hypertension forces your heart to work harder to pump blood. Your arteries may become narrow or stiff. Having untreated or uncontrolled hypertension for a long period of time can cause heart attack, stroke, kidney disease, and other problems. If started on a medication, take exactly as prescribed by your health care professional. Maintain a healthy lifestyle and follow up with your primary care physician.    SEEK IMMEDIATE MEDICAL CARE IF YOU HAVE ANY OF THE FOLLOWING SYMPTOMS: severe headache, confusion, chest pain, abdominal pain, vomiting, or shortness of breath.     Rest, drink plenty of fluids.  Advance activity as tolerated.  Continue all previously prescribed medications as directed.  Follow up with your PMD 2-3 days and bring copies of your results.  Return to the ER for worsening symptoms

## 2021-09-26 NOTE — ED PROVIDER NOTE - OBJECTIVE STATEMENT
61M PMHx of HTN, DM, Anoxic brain injury, HLD presenting with hypertension, SBP 200s over past day. Historian is wife. Patient unable to give a coherent history. Compliant with home medications. Patient denies any headaches, fevers, chills, chest pain, shortness of breath, abdominal pain, visual complaints, falls, trauma, dysuria/hematuria, recent travel, recent surgery, immobility, extremity swelling, hemoptysis, hormone use, known personal cancer history, or personal/family history of blood clots.

## 2021-09-26 NOTE — ED ADULT NURSE NOTE - NSIMPLEMENTINTERV_GEN_ALL_ED
Implemented All Universal Safety Interventions:  [place] to call system. Call bell, personal items and telephone within reach. Instruct patient to call for assistance. Room bathroom lighting operational. Non-slip footwear when patient is off stretcher. Physically safe environment: no spills, clutter or unnecessary equipment. Stretcher in lowest position, wheels locked, appropriate side rails in place.

## 2021-09-26 NOTE — ED PROVIDER NOTE - CLINICAL SUMMARY MEDICAL DECISION MAKING FREE TEXT BOX
Patient presents to the ED for high blood pressure. Otherwise, the patient is asymptomatic without confusion, chest pain, hematuria, shortness of breath, or severe headache. The BP today is SBP 200s. The patient is currently on medication. There is no evidence of hypertensive emergency or acute renal failure. Per the ACE Clinical Policies Subcommittee on Asymptomatic Hypertension (Annals of Emergency Medicine. 2013; 62(1):59-63, this patient does not require further diagnostic workup or acute intravenous administration of antihypertensive medications in the ED.  DDX: Doubt cardiovascular, acute myocardial ischemia/infarction, heart failure, renal infarction/failure, or other end organ damage.  - Discussed w/ the patient their elevated blood pressure and need for close outpatient management of their hypertension.   - Patient advised to follow up with their primary care doctor as directed.

## 2021-09-27 ENCOUNTER — NON-APPOINTMENT (OUTPATIENT)
Age: 61
End: 2021-09-27

## 2021-09-30 ENCOUNTER — APPOINTMENT (OUTPATIENT)
Dept: PHYSICAL MEDICINE AND REHAB | Facility: CLINIC | Age: 61
End: 2021-09-30
Payer: MEDICARE

## 2021-09-30 VITALS
TEMPERATURE: 199.76 F | SYSTOLIC BLOOD PRESSURE: 196 MMHG | HEART RATE: 75 BPM | DIASTOLIC BLOOD PRESSURE: 96 MMHG | OXYGEN SATURATION: 100 %

## 2021-09-30 PROCEDURE — 99214 OFFICE O/P EST MOD 30 MIN: CPT | Mod: GC

## 2021-09-30 NOTE — HISTORY OF PRESENT ILLNESS
[FreeTextEntry1] : Patient is a 61-year-old male history of hypertension, DM, anoxic brain injury (2009) who was last seen January 21, 2021. Patient's anabel Scott provided much of the history. Patient did not start outpatient physical therapy at the last visit. Patient did not follow up with neurology and was seen by PMD which obtained an MRI of his brain, results noted. Sonia reports that the patient suffered a fall approximately one week ago via tripping in the street. Repeat head CT was negative. Overall Sonia feels that over the past one year the patient's level of functioning and balance has decreased. Since his fall patient's had increased difficulty donning his underwear and has noted some bladder urgency's. Patient ambulates in the home with distant supervision and no assistive device. Patient transfers independently.

## 2021-09-30 NOTE — PHYSICAL EXAM
[FreeTextEntry1] : General: Alert. No acute distress. Accompanied by his fianceSonia. During encounter, Sonia provided much of history; patient did not answer any questions, unless prompted by examiner. \par \par Neurologic: Awake and alert and able to follow simple commands. Immediate recall intact 3/3, but delayed recall impaired 1/2 with prompting. Concentration impaired, unable to spell the word 'WORLD" backwards. Naming intact and able to repeat "No ifs ands, or buts". \par \par DTRs diminished but symmetric.  \par Sensation to light touch and pinprick intact in bilateral LEs\par \par Strength: 5/5 in bilateral shoulder abduction, elbow flexion, elbow extension, wrist extension, handgrip, hip flexion, knee flexion, knee extension, dorsiflexion, and plantarflexion. Able to stand on toes and heels with careful prompting. During gait cycle, has good heel strike without evidence of foot drop, but diminished arm swing. \par No pronator drift.\par \par Musculoskeletal: mildly diffuse rigidity in bilateral upper extremities. \par \par Functional status: patient ambulated (I) without AD with shallow HS noted (B), diminished arm swing (B)

## 2021-09-30 NOTE — ASSESSMENT
[FreeTextEntry1] : Patient is a 61-year-old male history of hypertension, DM, anoxic brain injury (2009) with reported decline in function over the past one year. Neurological exam largely unchanged compared to last visit. MRI brain noted for mild dilatation of the ventricle which could represent mild normal pressure hydrocephalus. Discussed at length with Sonia that patient should have a neurology evaluation to further assess for NPH. Prescription provided for outpatient PT and OT 2-3 times per week, see RX.\par \par I spent a total of 30 minutes on the date of encounter evaluating and treating the patient including a discussion of treatment options.

## 2021-10-05 ENCOUNTER — NON-APPOINTMENT (OUTPATIENT)
Age: 61
End: 2021-10-05

## 2021-10-06 ENCOUNTER — LABORATORY RESULT (OUTPATIENT)
Age: 61
End: 2021-10-06

## 2021-10-06 ENCOUNTER — OUTPATIENT (OUTPATIENT)
Dept: OUTPATIENT SERVICES | Facility: HOSPITAL | Age: 61
LOS: 1 days | End: 2021-10-06
Payer: MEDICARE

## 2021-10-06 ENCOUNTER — APPOINTMENT (OUTPATIENT)
Dept: INTERNAL MEDICINE | Facility: CLINIC | Age: 61
End: 2021-10-06
Payer: MEDICARE

## 2021-10-06 VITALS
HEIGHT: 70.5 IN | WEIGHT: 170 LBS | OXYGEN SATURATION: 97 % | DIASTOLIC BLOOD PRESSURE: 100 MMHG | BODY MASS INDEX: 24.07 KG/M2 | HEART RATE: 65 BPM | SYSTOLIC BLOOD PRESSURE: 170 MMHG

## 2021-10-06 DIAGNOSIS — R79.89 OTHER SPECIFIED ABNORMAL FINDINGS OF BLOOD CHEMISTRY: ICD-10-CM

## 2021-10-06 DIAGNOSIS — L02.31 CUTANEOUS ABSCESS OF BUTTOCK: Chronic | ICD-10-CM

## 2021-10-06 DIAGNOSIS — Z93.1 GASTROSTOMY STATUS: Chronic | ICD-10-CM

## 2021-10-06 DIAGNOSIS — D64.9 ANEMIA, UNSPECIFIED: ICD-10-CM

## 2021-10-06 DIAGNOSIS — Z98.890 OTHER SPECIFIED POSTPROCEDURAL STATES: Chronic | ICD-10-CM

## 2021-10-06 DIAGNOSIS — I10 ESSENTIAL (PRIMARY) HYPERTENSION: ICD-10-CM

## 2021-10-06 LAB
HCT VFR BLD CALC: 36.5 % — LOW (ref 39–50)
HGB BLD-MCNC: 11.7 G/DL — LOW (ref 13–17)
MCHC RBC-ENTMCNC: 29.5 PG — SIGNIFICANT CHANGE UP (ref 27–34)
MCHC RBC-ENTMCNC: 32.1 GM/DL — SIGNIFICANT CHANGE UP (ref 32–36)
MCV RBC AUTO: 91.9 FL — SIGNIFICANT CHANGE UP (ref 80–100)
PLATELET # BLD AUTO: 180 K/UL — SIGNIFICANT CHANGE UP (ref 150–400)
RBC # BLD: 3.97 M/UL — LOW (ref 4.2–5.8)
RBC # FLD: 12.6 % — SIGNIFICANT CHANGE UP (ref 10.3–14.5)
WBC # BLD: 4.6 K/UL — SIGNIFICANT CHANGE UP (ref 3.8–10.5)
WBC # FLD AUTO: 4.6 K/UL — SIGNIFICANT CHANGE UP (ref 3.8–10.5)

## 2021-10-06 PROCEDURE — 80048 BASIC METABOLIC PNL TOTAL CA: CPT

## 2021-10-06 PROCEDURE — 85027 COMPLETE CBC AUTOMATED: CPT

## 2021-10-06 PROCEDURE — 82728 ASSAY OF FERRITIN: CPT

## 2021-10-06 PROCEDURE — G0463: CPT

## 2021-10-06 PROCEDURE — G0008: CPT

## 2021-10-06 PROCEDURE — 83550 IRON BINDING TEST: CPT

## 2021-10-06 PROCEDURE — 83540 ASSAY OF IRON: CPT

## 2021-10-06 PROCEDURE — 99215 OFFICE O/P EST HI 40 MIN: CPT | Mod: GC

## 2021-10-06 PROCEDURE — 90688 IIV4 VACCINE SPLT 0.5 ML IM: CPT

## 2021-10-06 PROCEDURE — 84466 ASSAY OF TRANSFERRIN: CPT

## 2021-10-07 LAB
ANION GAP SERPL CALC-SCNC: 18 MMOL/L — HIGH (ref 5–17)
BUN SERPL-MCNC: 18 MG/DL — SIGNIFICANT CHANGE UP (ref 7–23)
CALCIUM SERPL-MCNC: 9.5 MG/DL — SIGNIFICANT CHANGE UP (ref 8.4–10.5)
CHLORIDE SERPL-SCNC: 99 MMOL/L — SIGNIFICANT CHANGE UP (ref 96–108)
CO2 SERPL-SCNC: 24 MMOL/L — SIGNIFICANT CHANGE UP (ref 22–31)
CREAT SERPL-MCNC: 1.09 MG/DL — SIGNIFICANT CHANGE UP (ref 0.5–1.3)
FERRITIN SERPL-MCNC: 106 NG/ML — SIGNIFICANT CHANGE UP (ref 30–400)
GLUCOSE SERPL-MCNC: 137 MG/DL — HIGH (ref 70–99)
IRON SATN MFR SERPL: 109 UG/DL — SIGNIFICANT CHANGE UP (ref 45–165)
IRON SATN MFR SERPL: 37 % — SIGNIFICANT CHANGE UP (ref 16–55)
POTASSIUM SERPL-MCNC: 4.1 MMOL/L — SIGNIFICANT CHANGE UP (ref 3.5–5.3)
POTASSIUM SERPL-SCNC: 4.1 MMOL/L — SIGNIFICANT CHANGE UP (ref 3.5–5.3)
SODIUM SERPL-SCNC: 142 MMOL/L — SIGNIFICANT CHANGE UP (ref 135–145)
TIBC SERPL-MCNC: 293 UG/DL — SIGNIFICANT CHANGE UP (ref 220–430)
TRANSFERRIN SERPL-MCNC: 246 MG/DL — SIGNIFICANT CHANGE UP (ref 200–360)
UIBC SERPL-MCNC: 184 UG/DL — SIGNIFICANT CHANGE UP (ref 110–370)

## 2021-10-07 NOTE — PHYSICAL EXAM
[Normal] : affect was normal and insight and judgment were intact [de-identified] : AAO2; at baseline

## 2021-10-07 NOTE — HISTORY OF PRESENT ILLNESS
[Spouse] : spouse [FreeTextEntry8] : Patient is a 62 yo M w/ PMH of DM2, HTN, anoxic brain inury 2/2 hypoglycemia, HLD who presents for f/u after ED visit for elevated BP. Patient records BP at home. SBPs were in the 200s. Patient asymptomatic at the time. Brought to ED by spouse. Work-up in ED not suggestive of hypertensive emergency. Found to be anemic and with elevated Cr in ED. Since then, BPs at home 170s/100s. Currently on lisinopril and labetolol. Remains asymptomatic. Denies HA, vision changes, SOB, CP, abdominal pain, urinary changes. \par \par Patient also requesting medication refills.

## 2021-10-07 NOTE — PLAN
[FreeTextEntry1] : 60yo M with PMhx of anoxic brain injury, HTN, long-standing anemia, and frequent falls who presents for ED f/u. All hx is provided by wife. Pt's wife had noticed BP running in the 200s SBP and reported to ED for evaluation. Lab evaluation noted for mild DELMY and hypoK. Currently on labetalol/ lisinopril. Formerly on amlodipine, but not currently taking, wife unable to tell me why pt is no longer on it. On PE, pt is awake/alert but does not respond to questions, at baseline per wife. CP exam unremarkable. Plan for repeat BMP to monitor DELMY, CBC/iron for anemia. Restart amlodipine. Close follow-up for improved BP control. Following with PMR/PT for frequent falls, handicap placard application filled and given to pt. Discussed shingrix shot.

## 2021-10-07 NOTE — ASSESSMENT
[FreeTextEntry1] : Patient is a 60 yo M w/ PMH of DM2, HTN, anoxic brain inury 2/2 hypoglycemia, HLD who presents for f/u after ED visit for elevated BP.\par \par #Hypertension\par - patietn still hypertensve to 170s/100s\par - patient on lisinopril 40 qd and labetolol 500 TID\par - start amlodipine 5mg qd\par \par #Anemia\par - CBC, iron studies\par \par #Elevated creatinine\par - BMP\par \par #HCM\par - flu shot

## 2021-10-08 DIAGNOSIS — Z23 ENCOUNTER FOR IMMUNIZATION: ICD-10-CM

## 2021-10-08 DIAGNOSIS — R26.89 OTHER ABNORMALITIES OF GAIT AND MOBILITY: ICD-10-CM

## 2021-10-08 DIAGNOSIS — D64.9 ANEMIA, UNSPECIFIED: ICD-10-CM

## 2021-10-08 DIAGNOSIS — R79.89 OTHER SPECIFIED ABNORMAL FINDINGS OF BLOOD CHEMISTRY: ICD-10-CM

## 2021-10-13 ENCOUNTER — RX RENEWAL (OUTPATIENT)
Age: 61
End: 2021-10-13

## 2021-10-25 DIAGNOSIS — J30.9 ALLERGIC RHINITIS, UNSPECIFIED: ICD-10-CM

## 2021-10-27 ENCOUNTER — RX RENEWAL (OUTPATIENT)
Age: 61
End: 2021-10-27

## 2021-11-11 ENCOUNTER — APPOINTMENT (OUTPATIENT)
Dept: PHYSICAL MEDICINE AND REHAB | Facility: CLINIC | Age: 61
End: 2021-11-11

## 2021-12-06 ENCOUNTER — RX RENEWAL (OUTPATIENT)
Age: 61
End: 2021-12-06

## 2022-01-13 ENCOUNTER — APPOINTMENT (OUTPATIENT)
Dept: NEUROLOGY | Facility: CLINIC | Age: 62
End: 2022-01-13

## 2022-01-31 ENCOUNTER — RX RENEWAL (OUTPATIENT)
Age: 62
End: 2022-01-31

## 2022-02-01 ENCOUNTER — APPOINTMENT (OUTPATIENT)
Dept: INTERNAL MEDICINE | Facility: CLINIC | Age: 62
End: 2022-02-01

## 2022-02-01 ENCOUNTER — NON-APPOINTMENT (OUTPATIENT)
Age: 62
End: 2022-02-01

## 2022-02-01 RX ORDER — AMLODIPINE BESYLATE 5 MG/1
5 TABLET ORAL DAILY
Qty: 30 | Refills: 3 | Status: DISCONTINUED | COMMUNITY
Start: 2021-10-06 | End: 2022-02-01

## 2022-02-10 ENCOUNTER — APPOINTMENT (OUTPATIENT)
Dept: PHYSICAL MEDICINE AND REHAB | Facility: CLINIC | Age: 62
End: 2022-02-10
Payer: MEDICARE

## 2022-02-10 VITALS
HEART RATE: 76 BPM | TEMPERATURE: 98 F | SYSTOLIC BLOOD PRESSURE: 177 MMHG | DIASTOLIC BLOOD PRESSURE: 80 MMHG | OXYGEN SATURATION: 98 %

## 2022-02-10 VITALS — DIASTOLIC BLOOD PRESSURE: 82 MMHG | SYSTOLIC BLOOD PRESSURE: 160 MMHG

## 2022-02-10 PROCEDURE — 99212 OFFICE O/P EST SF 10 MIN: CPT

## 2022-02-10 NOTE — PHYSICAL EXAM
[FreeTextEntry1] : General: Alert. No acute distress. During encounter, Sonia provided much of history; patient did not answer any questions, unless prompted by examiner. \par \par Neurologic: Awake and alert and able to follow simple commands. Immediate recall intact 3/3, but delayed recall impaired 1/2 with prompting. Concentration impaired, unable to spell the word 'WORLD" backwards. Object naming intact and able to repeat "No ifs ands, or buts". \par \par DTRs diminished but symmetric.  \par Sensation to light touch  intact in bilateral LEs\par \par Strength: 5/5 in bilateral shoulder abduction, elbow flexion, elbow extension, wrist extension, handgrip, hip flexion, knee flexion, knee extension, dorsiflexion, and plantarflexion. Able to stand on toes and heels with careful prompting. No pronator drift.\par \par Musculoskeletal: mildly diffuse rigidity in bilateral upper extremities. \par \par Functional status: patient ambulated (I) without AD with shallow HS noted (B), diminished arm swing (B) with trunk flexed posture

## 2022-02-10 NOTE — HISTORY OF PRESENT ILLNESS
[FreeTextEntry1] : Patient is a 61-year-old male history of hypertension, DM, anoxic brain injury (2009) who was last seen September 30, 2021 and presents for a followup visit. Patient completed outpatient occupational therapy yesterday and continues outpatient physical therapy 2 times per week. Patient's wife feels that the patient has gotten stronger and is walking with more confidence and his transfers have improved.. Patient still has some balance impairment. No falls reported, no pain complaints. Functionally the patient's ambulating in the home without assistive device with distant supervision. Patient independent with bed transfers, independent toileting. Neurology evaluation pending

## 2022-02-10 NOTE — ASSESSMENT
[FreeTextEntry1] : Patient is a 61-year-old male history of hypertension, DM, anoxic brain injury (2009) who is doing well with outpatient therapy services. Patient to complete outpatient physical therapy. Neurological exam unchanged since last visit and Neurology evaluation pending to further assess for NPH. \par \par I spent a total of 15 minutes on the date of the encounter evaluating and treating the patient including a discussion of treatment options.

## 2022-02-10 NOTE — REVIEW OF SYSTEMS
[Difficulty Walking] : difficulty walking [Negative] : Gastrointestinal [Fever] : no fever [Muscle Weakness] : no muscle weakness

## 2022-02-11 ENCOUNTER — TRANSCRIPTION ENCOUNTER (OUTPATIENT)
Age: 62
End: 2022-02-11

## 2022-02-18 ENCOUNTER — APPOINTMENT (OUTPATIENT)
Dept: INTERNAL MEDICINE | Facility: CLINIC | Age: 62
End: 2022-02-18

## 2022-02-18 DIAGNOSIS — F09 UNSPECIFIED MENTAL DISORDER DUE TO KNOWN PHYSIOLOGICAL CONDITION: ICD-10-CM

## 2022-03-10 ENCOUNTER — OUTPATIENT (OUTPATIENT)
Dept: OUTPATIENT SERVICES | Facility: HOSPITAL | Age: 62
LOS: 1 days | End: 2022-03-10
Payer: MEDICARE

## 2022-03-10 ENCOUNTER — APPOINTMENT (OUTPATIENT)
Dept: INTERNAL MEDICINE | Facility: CLINIC | Age: 62
End: 2022-03-10
Payer: MEDICARE

## 2022-03-10 VITALS
WEIGHT: 181 LBS | OXYGEN SATURATION: 97 % | HEIGHT: 70.5 IN | SYSTOLIC BLOOD PRESSURE: 150 MMHG | HEART RATE: 68 BPM | BODY MASS INDEX: 25.62 KG/M2 | DIASTOLIC BLOOD PRESSURE: 82 MMHG

## 2022-03-10 DIAGNOSIS — Z98.890 OTHER SPECIFIED POSTPROCEDURAL STATES: Chronic | ICD-10-CM

## 2022-03-10 DIAGNOSIS — L02.31 CUTANEOUS ABSCESS OF BUTTOCK: Chronic | ICD-10-CM

## 2022-03-10 DIAGNOSIS — E78.00 PURE HYPERCHOLESTEROLEMIA, UNSPECIFIED: ICD-10-CM

## 2022-03-10 DIAGNOSIS — G91.9 HYDROCEPHALUS, UNSPECIFIED: ICD-10-CM

## 2022-03-10 DIAGNOSIS — I10 ESSENTIAL (PRIMARY) HYPERTENSION: ICD-10-CM

## 2022-03-10 DIAGNOSIS — Z93.1 GASTROSTOMY STATUS: Chronic | ICD-10-CM

## 2022-03-10 LAB — HBA1C MFR BLD HPLC: 7.6

## 2022-03-10 PROCEDURE — 83036 HEMOGLOBIN GLYCOSYLATED A1C: CPT

## 2022-03-10 PROCEDURE — 99213 OFFICE O/P EST LOW 20 MIN: CPT | Mod: GE

## 2022-03-10 PROCEDURE — G0463: CPT | Mod: 25

## 2022-03-10 NOTE — PHYSICAL EXAM
[No Acute Distress] : no acute distress [Well Nourished] : well nourished [Well Developed] : well developed [Well-Appearing] : well-appearing [Normal Sclera/Conjunctiva] : normal sclera/conjunctiva [PERRL] : pupils equal round and reactive to light [EOMI] : extraocular movements intact [Normal Outer Ear/Nose] : the outer ears and nose were normal in appearance [Normal Oropharynx] : the oropharynx was normal [No JVD] : no jugular venous distention [No Lymphadenopathy] : no lymphadenopathy [Supple] : supple [Thyroid Normal, No Nodules] : the thyroid was normal and there were no nodules present [No Respiratory Distress] : no respiratory distress  [No Accessory Muscle Use] : no accessory muscle use [Clear to Auscultation] : lungs were clear to auscultation bilaterally [Normal Rate] : normal rate  [Regular Rhythm] : with a regular rhythm [Normal S1, S2] : normal S1 and S2 [No Murmur] : no murmur heard [Pedal Pulses Present] : the pedal pulses are present [No Edema] : there was no peripheral edema [No Extremity Clubbing/Cyanosis] : no extremity clubbing/cyanosis [Soft] : abdomen soft [Non Tender] : non-tender [Non-distended] : non-distended [Normal Bowel Sounds] : normal bowel sounds [No Joint Swelling] : no joint swelling [No Rash] : no rash [No Focal Deficits] : no focal deficits

## 2022-03-11 NOTE — HISTORY OF PRESENT ILLNESS
[FreeTextEntry1] : CPE [de-identified] : 61M PMH T2DM, HTN, TBI/anoxic brain injury. HLD presenting for CPE with fiance.\par \par Main reason for visit per patient and fiance is to help get additional HHA hours. Currently, patient receives 8 hours/day Wed-Sat since TBI, and HHA helps with transport/medications/meals. Pt has worsening balance and cognitive impairments, and needs additional hours of help while fiance is not at home. Also hoping to re-establish care with specialists (optho and podiatry for T2DM, neuro for hydrocephalus seen on imaging, ENT for deviated septum).\par \par #T2DM\par last A1c 01/2021: 6.7, has been taking metformin 1000mg BID. Has not seen optho and podiatry since covid pandemic. alb/cr ratio in 800s previously\par \par #HTN\par taking lisinopril 40 qd, labetolol 500mg tid, amlodipine 10mg qd. Repeat BP in office 140/80. No HA.vision changes/n/v

## 2022-03-11 NOTE — ASSESSMENT
[FreeTextEntry1] : 61M PMH T2DM, HTN, TBI/anoxic brain injury. HLD presenting for CPE with fiance.\par \par #HHA\par -pt and fiance requesting increased hours of HHA due to progressive balance and cognitive issues. Fiance concerned patient may fall unwitnessed when she is not at home\par -letter given to patient to send to insurance for increased hours\par \par #HTN\par -initial BP in office 150/82, repeat 140/80\par -C/w current regimen lisinopril 40qd, labetolol 500mg TID, amlodipine 10mg qd\par -refills sent\par \par #T2DM\par -POCa1c 7.6, increased from 6.7 one year ago\par -c/w current regimen metformin 1000mg BID\par -encouraged dietary modifications\par -Podiatry and optho referral given\par -f/u microalbumin, consider SGLT2 inhibitors\par \par #HLD\par -changed simvastatin to atorvastatin 20mg\par -last LDL in 2020 was 115. Pt was not fasting this visit. To change to atorvastatin 20mg for better control and follow up for fasting lipid panel\par \par #hydrocephalus\par -CT and MRI showing mild normal pressure hydrocephalus\par -neuro referral for follow up for hydrocephalus and TBI\par \par #health care maintenance\par -Due to PPSV23, patient and fiance will consider next visit. Encouraged vaccine as pt has T2DM\par -encouraged shingles vaccine \par -lung cancer screening done 2020, showing small upper lobe nodule. Repeat CT referral given for monitoring\par -covid vaccinated x3\par -flu vaccine 10/2021\par -pneumo 13, tdap given 2019\par -colonoscopy done 2017\par \par case discussed with Dr. Kinney\par f/u 6 months\par

## 2022-03-11 NOTE — HEALTH RISK ASSESSMENT
[Former] : Former [20 or more] : 20 or more [No] : No [Two or more falls in past year] : Patient reported two or more falls in the past year [0] : 2) Feeling down, depressed, or hopeless: Not at all (0) [PHQ-2 Negative - No further assessment needed] : PHQ-2 Negative - No further assessment needed [2] : 2 [LowDoseCTScan] : 12/20

## 2022-03-14 ENCOUNTER — RX RENEWAL (OUTPATIENT)
Age: 62
End: 2022-03-14

## 2022-03-16 DIAGNOSIS — G91.9 HYDROCEPHALUS, UNSPECIFIED: ICD-10-CM

## 2022-03-16 DIAGNOSIS — E11.9 TYPE 2 DIABETES MELLITUS WITHOUT COMPLICATIONS: ICD-10-CM

## 2022-03-16 DIAGNOSIS — E78.00 PURE HYPERCHOLESTEROLEMIA, UNSPECIFIED: ICD-10-CM

## 2022-03-17 ENCOUNTER — APPOINTMENT (OUTPATIENT)
Dept: PHYSICAL MEDICINE AND REHAB | Facility: CLINIC | Age: 62
End: 2022-03-17

## 2022-04-14 ENCOUNTER — APPOINTMENT (OUTPATIENT)
Dept: PHYSICAL MEDICINE AND REHAB | Facility: CLINIC | Age: 62
End: 2022-04-14
Payer: MEDICARE

## 2022-04-14 VITALS
DIASTOLIC BLOOD PRESSURE: 81 MMHG | SYSTOLIC BLOOD PRESSURE: 144 MMHG | OXYGEN SATURATION: 98 % | HEART RATE: 79 BPM | TEMPERATURE: 97.3 F

## 2022-04-14 PROCEDURE — 99212 OFFICE O/P EST SF 10 MIN: CPT | Mod: GC

## 2022-04-20 NOTE — PHYSICAL EXAM
[FreeTextEntry1] : General: Alert. No acute distress. During encounter, Sonia provided much of history; patient did not answer any questions, unless prompted by examiner. \par \par Neurologic: Awake and alert and able to follow simple commands, oriented to person and place, not to date. Immediate recall intact 3/3, but delayed recall impaired 2/3 with prompting. Concentration impaired, unable to spell the word 'WORLD" backwards. Object naming intact and able to repeat "No ifs ands, or buts". \par \par DTRs diminished but symmetric. \par Sensation to light touch intact in bilateral LEs\par \par Strength: 5/5 in bilateral shoulder abduction, elbow flexion, elbow extension, wrist extension, handgrip, hip flexion, knee flexion, knee extension, dorsiflexion, and plantarflexion. Able to stand on toes and heels with careful prompting. No pronator drift.\par \par Musculoskeletal: mildly diffuse rigidity in bilateral upper extremities. \par \par Functional status: patient ambulated (I) without AD with shallow HS noted (B), diminished arm swing (B) with trunk flexed posture.

## 2022-04-20 NOTE — HISTORY OF PRESENT ILLNESS
[FreeTextEntry1] : Patient is a 61-year-old male history of hypertension, DM, anoxic brain injury (2009) who was last seen February 10, 2022 and presents for a followup visit. Patient completed outpatient occupational therapy and outpatient physical therapy since last visit. Patient's wife feels that since completing his therapies, his turns are worse and he loses his balance. Over the past 2 months, he had 1 fall out of bed while sleeping. He had a near fall in the bathroom, but was able to catch himself. He denies any pain, dizziness or lightheadedness. States that he feels off balance at times. He is independent in transfers, ambulation, bathing, toileting. His wife states that he occasionally requires assistance with dressing.  Functionally the patient's ambulating in the home without assistive device with supervision, contact guard.

## 2022-04-20 NOTE — ASSESSMENT
[FreeTextEntry1] : Patient is a 61-year-old male history of hypertension, DM, anoxic brain injury (2009) who was doing well with outpatient therapy services. Slight decline noted by wife since completing therapy program. Prescription provided to restart outpatient PT, see Rx. Neurological exam unchanged since last visit and Neurology evaluation pending to further assess for NPH. \par \par I spent a total of 15 minutes on the date of the encounter evaluating and treating the patient including a discussion of treatment options.\par

## 2022-05-12 NOTE — ED PROVIDER NOTE - CHIEF COMPLAINT
The patient is a 60y Male complaining of lacerations. borderline controlled, continue current medications   BL knees, BL hands marleni right CMC  Right knee - sometimes gives out   Tylenol BID

## 2022-07-19 PROBLEM — H40.003 GLAUCOMA SUSPECT OF BOTH EYES: Status: RESOLVED | Noted: 2018-02-01 | Resolved: 2022-07-19

## 2022-11-16 ENCOUNTER — OUTPATIENT (OUTPATIENT)
Dept: OUTPATIENT SERVICES | Facility: HOSPITAL | Age: 62
LOS: 1 days | End: 2022-11-16
Payer: MEDICARE

## 2022-11-16 ENCOUNTER — APPOINTMENT (OUTPATIENT)
Dept: INTERNAL MEDICINE | Facility: CLINIC | Age: 62
End: 2022-11-16

## 2022-11-16 VITALS
DIASTOLIC BLOOD PRESSURE: 96 MMHG | WEIGHT: 177 LBS | BODY MASS INDEX: 25.06 KG/M2 | HEIGHT: 70.5 IN | OXYGEN SATURATION: 98 % | HEART RATE: 76 BPM | SYSTOLIC BLOOD PRESSURE: 160 MMHG

## 2022-11-16 DIAGNOSIS — Z98.890 OTHER SPECIFIED POSTPROCEDURAL STATES: Chronic | ICD-10-CM

## 2022-11-16 DIAGNOSIS — L02.31 CUTANEOUS ABSCESS OF BUTTOCK: Chronic | ICD-10-CM

## 2022-11-16 DIAGNOSIS — Z93.1 GASTROSTOMY STATUS: Chronic | ICD-10-CM

## 2022-11-16 DIAGNOSIS — I10 ESSENTIAL (PRIMARY) HYPERTENSION: ICD-10-CM

## 2022-11-16 LAB — HBA1C MFR BLD HPLC: 7.3

## 2022-11-16 PROCEDURE — G0008: CPT

## 2022-11-16 PROCEDURE — 99214 OFFICE O/P EST MOD 30 MIN: CPT | Mod: GC

## 2022-11-16 PROCEDURE — 80048 BASIC METABOLIC PNL TOTAL CA: CPT

## 2022-11-16 PROCEDURE — G0463: CPT | Mod: 25

## 2022-11-16 PROCEDURE — 83036 HEMOGLOBIN GLYCOSYLATED A1C: CPT

## 2022-11-16 PROCEDURE — 85025 COMPLETE CBC W/AUTO DIFF WBC: CPT

## 2022-11-16 PROCEDURE — 90688 IIV4 VACCINE SPLT 0.5 ML IM: CPT

## 2022-11-18 LAB
ANION GAP SERPL CALC-SCNC: 15 MMOL/L
BASOPHILS # BLD AUTO: 0.03 K/UL
BASOPHILS NFR BLD AUTO: 0.5 %
BUN SERPL-MCNC: 18 MG/DL
CALCIUM SERPL-MCNC: 10 MG/DL
CHLORIDE SERPL-SCNC: 102 MMOL/L
CO2 SERPL-SCNC: 26 MMOL/L
CREAT SERPL-MCNC: 1.14 MG/DL
EGFR: 72 ML/MIN/1.73M2
EOSINOPHIL # BLD AUTO: 0.19 K/UL
EOSINOPHIL NFR BLD AUTO: 3.3 %
GLUCOSE SERPL-MCNC: 267 MG/DL
HCT VFR BLD CALC: 36.9 %
HGB BLD-MCNC: 12 G/DL
IMM GRANULOCYTES NFR BLD AUTO: 0.2 %
LYMPHOCYTES # BLD AUTO: 1.66 K/UL
LYMPHOCYTES NFR BLD AUTO: 28.6 %
MAN DIFF?: NORMAL
MCHC RBC-ENTMCNC: 29.6 PG
MCHC RBC-ENTMCNC: 32.5 GM/DL
MCV RBC AUTO: 91.1 FL
MONOCYTES # BLD AUTO: 0.46 K/UL
MONOCYTES NFR BLD AUTO: 7.9 %
NEUTROPHILS # BLD AUTO: 3.46 K/UL
NEUTROPHILS NFR BLD AUTO: 59.5 %
PLATELET # BLD AUTO: 206 K/UL
POTASSIUM SERPL-SCNC: 3.8 MMOL/L
RBC # BLD: 4.05 M/UL
RBC # FLD: 12.9 %
SODIUM SERPL-SCNC: 143 MMOL/L
WBC # FLD AUTO: 5.81 K/UL

## 2022-11-21 NOTE — HISTORY OF PRESENT ILLNESS
[Spouse] : spouse [FreeTextEntry1] : no acute complaints [de-identified] : Mr Matias 62M hx anoxic brain injury (2009; 2/2 hypoglycemic coma), DM2 (A1c 7.3% 11/2022), and HLD presenting to clinic for f/u appointment.\par \par No acute complaints today, requesting additional bloodwork.\par \par #TBI\par Patient f/u with neurology (Dr. Fernandez) following evidence of hydrocephalus on CT head and f/u MRI head. Patient believed to have parkinsonianism and prescribed Carvidopa-Levadopa but has not started taking it yet as patient's fiance is awaiting further discussion/final diagnosis from neurologist before starting patient on the new medication. Patient's fiance reports periods of forgetfulness and confusion that has been typical since hypoglycemic coma. New development in the past 2 years of wide-based shuffling gait.\par \par #DM2\par A1c today 7.3% down from 7.6%. Improvement with diet/exercise and on continued Metformin 1000 BID.\par \par #HTN\par BP elevated at 160/86 today on recheck, consistently elevated at home. Fiance reports this is "on the low side" for him. Patient adherent via fiance with Amlodipine 10, Labetalol 500 TID, Lisinopril 40mg and asking for lisinopril refill.

## 2022-11-21 NOTE — PHYSICAL EXAM
[No Acute Distress] : no acute distress [Well Nourished] : well nourished [Well Developed] : well developed [Well-Appearing] : well-appearing [Normal Sclera/Conjunctiva] : normal sclera/conjunctiva [EOMI] : extraocular movements intact [No Lymphadenopathy] : no lymphadenopathy [No Respiratory Distress] : no respiratory distress  [Clear to Auscultation] : lungs were clear to auscultation bilaterally [Normal Rate] : normal rate  [Regular Rhythm] : with a regular rhythm [Normal S1, S2] : normal S1 and S2 [Soft] : abdomen soft [Non Tender] : non-tender [Non-distended] : non-distended [No Focal Deficits] : no focal deficits [Normal Affect] : the affect was normal [Alert and Oriented x3] : oriented to person, place, and time [de-identified] : wide-based shuffling gait, increased tone in wrist though difficult to assess given patient's mental status

## 2022-11-21 NOTE — REVIEW OF SYSTEMS
[Confusion] : confusion [Unsteady Walk] : ataxia [Memory Loss] : memory loss [Fever] : no fever [Fatigue] : no fatigue [Chest Pain] : no chest pain [Palpitations] : no palpitations [Shortness Of Breath] : no shortness of breath [Cough] : no cough [Abdominal Pain] : no abdominal pain [Incontinence] : no incontinence [Dizziness] : no dizziness [Insomnia] : no insomnia [Depression] : no depression

## 2022-11-21 NOTE — ASSESSMENT
[FreeTextEntry1] : Mr Matias 62M hx anoxic brain injury (2009; 2/2 hypoglycemic coma), DM2 (A1c 7.3% 11/2022), and HLD presenting to clinic for f/u appointment.\par \par #HCM\par - Recheck BP next visit in 3 months\par - Patient counseled on lifestyle interventions regarding BMI and importance for preventing DM2/CVD\par - Vax: [received today] Flu, [patient educated to get it outpatient due to insurance] Zoster, [got booster 1 yr ago, due for next booster, patient educated] Covid\par - Routine labs: [X] CBC, [X] BMP, [POC 7.3%] A1c ordered \par - Patient received PPSV13 in 2019 and PPSV23 in 2010, due for PPSV20 after age 65\par - Due for repeat lung cancer CT chest screen; ordered\par - discuss stopping primary prevention ASA 81mg at next appointment\par - patient to f/u with ENt for deviated septum\par \par MAGY HERNANDEZ MD, PhD - PGY-1\par 11/16/2022

## 2022-11-22 DIAGNOSIS — Z23 ENCOUNTER FOR IMMUNIZATION: ICD-10-CM

## 2022-11-22 DIAGNOSIS — G20 PARKINSON'S DISEASE: ICD-10-CM

## 2022-11-28 ENCOUNTER — APPOINTMENT (OUTPATIENT)
Dept: PHYSICAL MEDICINE AND REHAB | Facility: CLINIC | Age: 62
End: 2022-11-28

## 2022-11-28 VITALS
SYSTOLIC BLOOD PRESSURE: 172 MMHG | OXYGEN SATURATION: 99 % | DIASTOLIC BLOOD PRESSURE: 92 MMHG | TEMPERATURE: 97.7 F | HEART RATE: 78 BPM

## 2022-11-28 PROCEDURE — 99212 OFFICE O/P EST SF 10 MIN: CPT

## 2022-11-28 NOTE — ASSESSMENT
[FreeTextEntry1] : Patient is a 62-year-old male history of hypertension, DM, anoxic brain injury (2009) who was started on a trial of Sinemet approximately 1 week ago due to potential parkinsonism.  Patient with decline in balance and prescription provided to restart outpatient PT, see Rx. Neurological exam unchanged since last visit and patient with continued neurology follow-up.  \par \par I spent a total of 15 minutes on the date of the encounter evaluating and treating the patient including a discussion of treatment options.\par

## 2022-11-28 NOTE — PHYSICAL EXAM
[FreeTextEntry1] : General: Alert. No acute distress. During encounter, Sonia provided much of history; patient did not answer any questions, unless prompted by examiner. \par \par Neurologic: Awake and alert and able to follow simple commands, oriented to person and place, not to date. Immediate recall intact 3/3, but delayed recall impaired 1/3 with prompting. Concentration impaired, unable to spell the word 'WORLD" backwards. Object naming intact and able to repeat "No ifs ands, or buts". \par \par DTRs diminished but symmetric. \par Sensation to light touch intact in bilateral LEs\par \par Strength: 5/5 in bilateral shoulder abduction, elbow flexion, elbow extension, wrist extension, handgrip, hip flexion, knee flexion, knee extension, dorsiflexion, and plantarflexion. Able to stand on toes and heels with careful prompting. No pronator drift.\par \par Musculoskeletal: mildly diffuse rigidity in bilateral upper extremities. \par \par Functional status: patient ambulated (I) without AD with shallow HS noted (B), diminished arm swing (B) with trunk flexed posture.

## 2022-11-28 NOTE — HISTORY OF PRESENT ILLNESS
[FreeTextEntry1] : Patient is a 62-year-old male history of hypertension, DM, anoxic brain injury (2009) who was last seen April 14, 2022 and presents for a followup visit.  Patient's wife reports that the patient had follow-up neurology, Dr. Fernandez, appointment last week and was started on a trial of Sinemet for potential parkinsonism, MRI brain is pending.  Patient wife reports that the patient tends to walk with a trunk flexed posture and feels he is off balance at times.  Patient completed outpatient physical therapy in 4/2022.  No falls reported.  He denies any pain, dizziness or lightheadedness.  He is independent in transfers, ambulation, bathing, toileting. His wife states that he occasionally requires assistance with dressing.  Functionally the patient's ambulating in the home without assistive device with supervision, contact guard.

## 2022-11-28 NOTE — REVIEW OF SYSTEMS
[Negative] : Respiratory [Fever] : no fever [Chills] : no chills [de-identified] : +balance issues, +difficulty walking

## 2022-12-08 ENCOUNTER — NON-APPOINTMENT (OUTPATIENT)
Age: 62
End: 2022-12-08

## 2022-12-08 VITALS — HEIGHT: 70 IN | BODY MASS INDEX: 25.34 KG/M2 | WEIGHT: 177 LBS

## 2022-12-08 DIAGNOSIS — Z87.891 PERSONAL HISTORY OF NICOTINE DEPENDENCE: ICD-10-CM

## 2022-12-08 NOTE — HISTORY OF PRESENT ILLNESS
[Former] : Former [TextBox_13] : Patient is scheduled for a  annual  LDCT for lung cancer screening. Referred by Dr. Jack. Attempts to reach patient unsuccessful. Chart review performed to confirm eligibility for LDCT. \par \par  No documented personal or family history of lung cancer. No documented s/s of lung cancer. Patient is a former smoker ( 2 years ago) with a 30 pack year history ( .[id number]).\par  [PacksperYear] : 30

## 2022-12-08 NOTE — PLAN
[FreeTextEntry1] : Eligability confirmed, LDCT scheduled for 12/12 at [facility]

## 2022-12-12 ENCOUNTER — APPOINTMENT (OUTPATIENT)
Dept: MRI IMAGING | Facility: IMAGING CENTER | Age: 62
End: 2022-12-12

## 2022-12-12 ENCOUNTER — APPOINTMENT (OUTPATIENT)
Dept: CT IMAGING | Facility: IMAGING CENTER | Age: 62
End: 2022-12-12

## 2022-12-19 ENCOUNTER — NON-APPOINTMENT (OUTPATIENT)
Age: 62
End: 2022-12-19

## 2022-12-19 LAB
CHOLEST SERPL-MCNC: 147 MG/DL
HDLC SERPL-MCNC: 46 MG/DL
LDLC SERPL CALC-MCNC: 86 MG/DL
NONHDLC SERPL-MCNC: 101 MG/DL
TRIGL SERPL-MCNC: 79 MG/DL

## 2023-01-23 ENCOUNTER — APPOINTMENT (OUTPATIENT)
Dept: PHYSICAL MEDICINE AND REHAB | Facility: CLINIC | Age: 63
End: 2023-01-23

## 2023-04-05 ENCOUNTER — APPOINTMENT (OUTPATIENT)
Dept: INTERNAL MEDICINE | Facility: CLINIC | Age: 63
End: 2023-04-05
Payer: MEDICARE

## 2023-04-05 ENCOUNTER — OUTPATIENT (OUTPATIENT)
Dept: OUTPATIENT SERVICES | Facility: HOSPITAL | Age: 63
LOS: 1 days | End: 2023-04-05
Payer: MEDICARE

## 2023-04-05 VITALS
OXYGEN SATURATION: 98 % | WEIGHT: 170 LBS | SYSTOLIC BLOOD PRESSURE: 148 MMHG | BODY MASS INDEX: 24.34 KG/M2 | HEART RATE: 76 BPM | HEIGHT: 70 IN | DIASTOLIC BLOOD PRESSURE: 80 MMHG

## 2023-04-05 DIAGNOSIS — G20 PARKINSON'S DISEASE: ICD-10-CM

## 2023-04-05 DIAGNOSIS — Z98.890 OTHER SPECIFIED POSTPROCEDURAL STATES: Chronic | ICD-10-CM

## 2023-04-05 DIAGNOSIS — L02.31 CUTANEOUS ABSCESS OF BUTTOCK: Chronic | ICD-10-CM

## 2023-04-05 DIAGNOSIS — Z12.2 ENCOUNTER FOR SCREENING FOR MALIGNANT NEOPLASM OF RESPIRATORY ORGANS: ICD-10-CM

## 2023-04-05 DIAGNOSIS — Z93.1 GASTROSTOMY STATUS: Chronic | ICD-10-CM

## 2023-04-05 DIAGNOSIS — E11.9 TYPE 2 DIABETES MELLITUS WITHOUT COMPLICATIONS: ICD-10-CM

## 2023-04-05 DIAGNOSIS — I10 ESSENTIAL (PRIMARY) HYPERTENSION: ICD-10-CM

## 2023-04-05 DIAGNOSIS — J34.2 DEVIATED NASAL SEPTUM: ICD-10-CM

## 2023-04-05 LAB — HBA1C MFR BLD HPLC: 8.5

## 2023-04-05 PROCEDURE — 80048 BASIC METABOLIC PNL TOTAL CA: CPT

## 2023-04-05 PROCEDURE — 99214 OFFICE O/P EST MOD 30 MIN: CPT | Mod: GC

## 2023-04-05 PROCEDURE — G0463: CPT

## 2023-04-05 PROCEDURE — 85025 COMPLETE CBC W/AUTO DIFF WBC: CPT

## 2023-04-10 LAB
BASOPHILS # BLD AUTO: 0.02 K/UL
BASOPHILS NFR BLD AUTO: 0.4 %
EOSINOPHIL # BLD AUTO: 0.27 K/UL
EOSINOPHIL NFR BLD AUTO: 5.3 %
HCT VFR BLD CALC: 37 %
HGB BLD-MCNC: 11.6 G/DL
IMM GRANULOCYTES NFR BLD AUTO: 0.2 %
LYMPHOCYTES # BLD AUTO: 1.66 K/UL
LYMPHOCYTES NFR BLD AUTO: 32.8 %
MAN DIFF?: NORMAL
MCHC RBC-ENTMCNC: 27.9 PG
MCHC RBC-ENTMCNC: 31.4 GM/DL
MCV RBC AUTO: 88.9 FL
MONOCYTES # BLD AUTO: 0.33 K/UL
MONOCYTES NFR BLD AUTO: 6.5 %
NEUTROPHILS # BLD AUTO: 2.77 K/UL
NEUTROPHILS NFR BLD AUTO: 54.8 %
PLATELET # BLD AUTO: 210 K/UL
RBC # BLD: 4.16 M/UL
RBC # FLD: 13.9 %
WBC # FLD AUTO: 5.06 K/UL

## 2023-04-11 ENCOUNTER — NON-APPOINTMENT (OUTPATIENT)
Age: 63
End: 2023-04-11

## 2023-04-11 LAB
ANION GAP SERPL CALC-SCNC: 16 MMOL/L
BUN SERPL-MCNC: 17 MG/DL
CALCIUM SERPL-MCNC: 10.1 MG/DL
CHLORIDE SERPL-SCNC: 96 MMOL/L
CO2 SERPL-SCNC: 27 MMOL/L
CREAT SERPL-MCNC: 1.09 MG/DL
EGFR: 76 ML/MIN/1.73M2
GLUCOSE SERPL-MCNC: 251 MG/DL
POTASSIUM SERPL-SCNC: 3.5 MMOL/L
SODIUM SERPL-SCNC: 139 MMOL/L

## 2023-05-22 ENCOUNTER — APPOINTMENT (OUTPATIENT)
Dept: PHYSICAL MEDICINE AND REHAB | Facility: CLINIC | Age: 63
End: 2023-05-22
Payer: MEDICARE

## 2023-05-22 VITALS
WEIGHT: 170 LBS | DIASTOLIC BLOOD PRESSURE: 81 MMHG | BODY MASS INDEX: 24.39 KG/M2 | TEMPERATURE: 97.1 F | OXYGEN SATURATION: 100 % | HEART RATE: 74 BPM | SYSTOLIC BLOOD PRESSURE: 165 MMHG

## 2023-05-22 DIAGNOSIS — R26.89 OTHER ABNORMALITIES OF GAIT AND MOBILITY: ICD-10-CM

## 2023-05-22 DIAGNOSIS — G93.1 ANOXIC BRAIN DAMAGE, NOT ELSEWHERE CLASSIFIED: ICD-10-CM

## 2023-05-22 PROCEDURE — 99212 OFFICE O/P EST SF 10 MIN: CPT

## 2023-05-22 RX ORDER — MUPIROCIN 20 MG/G
2 OINTMENT TOPICAL
Qty: 22 | Refills: 0 | Status: ACTIVE | COMMUNITY
Start: 2023-03-06

## 2023-05-22 RX ORDER — KETOCONAZOLE 20 MG/G
2 CREAM TOPICAL
Qty: 30 | Refills: 0 | Status: ACTIVE | COMMUNITY
Start: 2023-02-01

## 2023-05-22 NOTE — ASSESSMENT
[FreeTextEntry1] : Patient is a 63-year-old male history of hypertension, DM, anoxic brain injury (2009), parkinsonism.  Patient with improved balance and prescription provided to have physical therapy focus on gait training with a straight axis cane to potentially use in the community to improve independence.  Also to work on floor transfers, see Rx.   Neurological exam unchanged since last visit and patient with continued neurology follow-up.  \par \par I spent a total of 15 minutes on the date of the encounter evaluating and treating the patient including a discussion of treatment options.\par

## 2023-05-22 NOTE — HISTORY OF PRESENT ILLNESS
[FreeTextEntry1] : Patient is a 63-year-old male history of hypertension, DM, anoxic brain injury (2009) who was last seen Nov. 28, 2022 and presents for a followup visit.  Patient has been receiving outpatient physical therapy and wife reports improved balance.  Patient has not been falling as he was last year.  She only reports 1 slipped in the bathtub otherwise no falls.  Patient has been on Sinemet since last visit, questionable difference as per patient's wife.  Patient continuing to follow-up with neurology.   He denies any pain, dizziness or lightheadedness.  He is independent in transfers, bathing, toileting.  Patient ambulates independently in the home but requires close supervision in the community.  His wife states that he occasionally requires assistance with dressing.

## 2023-05-22 NOTE — PHYSICAL EXAM
[FreeTextEntry1] : General: Alert. No acute distress. During encounter, Sonia provided much of history; patient did not answer any questions, unless prompted by examiner. \par \par Neurologic: Awake and alert and able to follow simple commands, oriented to person and place, not to date. Immediate recall intact 3/3, but delayed recall impaired 1/3 with prompting. Concentration impaired, unable to spell the word 'WORLD" backwards. Object naming intact.\par \par DTRs diminished but symmetric. \par Sensation to light touch intact in bilateral LEs\par \par Strength: 5/5 in bilateral shoulder abduction, elbow flexion, elbow extension, wrist extension, handgrip, hip flexion, knee flexion, knee extension, dorsiflexion, and plantarflexion. Able to stand on toes and heels with careful prompting. No pronator drift.\par \par Musculoskeletal: mildly diffuse rigidity in bilateral upper extremities. \par \par Functional status: patient ambulated (I) without AD with shallow HS noted (B), diminished arm swing (B) with trunk flexed posture.

## 2023-05-22 NOTE — REVIEW OF SYSTEMS
[Negative] : Respiratory [Fever] : no fever [Chills] : no chills [Muscle Weakness] : no muscle weakness [de-identified] : +balance issues, +difficulty walking

## 2023-06-14 ENCOUNTER — APPOINTMENT (OUTPATIENT)
Dept: INTERNAL MEDICINE | Facility: CLINIC | Age: 63
End: 2023-06-14

## 2023-06-14 NOTE — ASSESSMENT
[FreeTextEntry1] : Mr Matias 62M hx anoxic brain injury (2009; 2/2 hypoglycemic coma), DM2 (A1c 7.3% 11/2022), and HLD presenting to clinic for f/u appointment. Last appt 11/2022 with me.\par \par - medication refills provided today\par \par #HCM\par - vax: flu UTD, Zoster instructed to get outpatient last appt, due for Covid bivalent boost as well\par - Due for PPSV20 given diabetes comorbidity, deferred today\par - Due for repeat lung cancer CT chest screen; eligability confirmed, LDCT was scheduled for 12/12 at [facility] but no record of imaging performed; CT chest re-ordered for patient to obtain when able\par - discussed stopping primary prevention ASA 81mg\par - patient to f/u with ENT for deviated septum, referral re-sent\par \par MAGY HERNANDEZ MD, PhD - PGY-1\par 04/05/2023

## 2023-06-14 NOTE — PHYSICAL EXAM
[No Acute Distress] : no acute distress [Well Nourished] : well nourished [Well Developed] : well developed [Well-Appearing] : well-appearing [Normal Sclera/Conjunctiva] : normal sclera/conjunctiva [EOMI] : extraocular movements intact [No Respiratory Distress] : no respiratory distress  [No Accessory Muscle Use] : no accessory muscle use [Clear to Auscultation] : lungs were clear to auscultation bilaterally [Normal Rate] : normal rate  [Regular Rhythm] : with a regular rhythm [Normal S1, S2] : normal S1 and S2 [No Varicosities] : no varicosities [No Edema] : there was no peripheral edema [Soft] : abdomen soft [Non Tender] : non-tender [Non-distended] : non-distended [Grossly Normal Strength/Tone] : grossly normal strength/tone [No Focal Deficits] : no focal deficits [de-identified] : slow gait [de-identified] : flat affect, restricted speech

## 2023-06-14 NOTE — HISTORY OF PRESENT ILLNESS
[FreeTextEntry1] : follow up [de-identified] : Mr Matias 62M hx anoxic brain injury (2009; 2/2 hypoglycemic coma), DM2 (A1c 7.3% 11/2022), and HLD presenting to clinic for f/u appointment. Last appt 11/2022 with me.\par \par #Anoxic Brain Injury\par Patient follows with neurology (Dr. Fernandez) following evidence of hydrocephalus on CT head and f/u MRI head 3/2021. Patient believed to have parkinsonianism and prescribed Carvidopa-Levadopa. Patient's fiance reports periods of forgetfulness and confusion that has been typical since hypoglycemic coma. New development in the past 2 years of wide-based shuffling gait. Following with [facility] and receiving outpt PT. No changes to discuss today. Patient has restricted speech but full capability of talking.\par \par #DM2\par - A1c 7.3% 11/2022,\par - A1c today 8.5%, likely diet related as patient compliant with Metformin\par - Cont diet/exercise and on Metformin 1000 BID; exercise with PT and at home\par - Blood glucoses 150-180 at home\par - Patient/wife to meet with Samantha Cardona\par \par #HTN\par - Last appt,  and elevated a home. Today, /80, and his SBPs at home have improved at home overall per wife\par - compliant with Amlodipine 10, Labetalol 500 TID, Lisinopril 40mg, and had started HCTZ 12.5mg qD last appt\par - Will keep meds as is for now and CTM home BP for future changes; also will get labs Friday to monitor kidney function and electrolytes given no labs done since starting HCTZ\par \par #Parkinsonian syndrome\par - Follows with neurology Dr. Fernandez\par - Prescribed Carvidopa-Levadopa 25-100mg BID; needs refill but instructed patient to have Dr. Fernandez refill since our office does not have access to their notes\par - Continuing to receive [facility]\par \par #Lesion on buttocks\par - Now resolved, but saw derm who biopsied and reportedly "benign" per wife\par - Wife puts vaseline on, and reports skin is not tearing or damaged at the site\par \par #HCM\par - vax: flu UTD, Zoster instructed to get outpatient last appt, due for Covid bivalent boost as well\par - Due for PPSV20 given diabetes comorbidity, deferred today\par - Due for repeat lung cancer CT chest screen; eligability confirmed, LDCT was scheduled for 12/12 at [facility] but no record of imaging performed; CT chest re-ordered for patient to obtain when able\par - discussed stopping primary prevention ASA 81mg\par - patient to f/u with ENT for deviated septum, referral re-sent\par \par Meds: Flonase, ASA 81mg, Atorvastatin 20mg, Amlodipine 10mg, HCTZ 12.5mg, Labetalol 500mg TID, Lisinopril 40mg, Timolol, Metformin 1000mg BID, Carbidopa-Levodopa 25-100mg

## 2023-06-14 NOTE — REVIEW OF SYSTEMS
[Fever] : no fever [Chills] : no chills [Chest Pain] : no chest pain [Palpitations] : no palpitations [Lower Ext Edema] : no lower extremity edema [Shortness Of Breath] : no shortness of breath [Wheezing] : no wheezing [Cough] : no cough [Abdominal Pain] : no abdominal pain [Nausea] : no nausea [Joint Pain] : no joint pain [Muscle Pain] : no muscle pain [Back Pain] : no back pain [Headache] : no headache [Dizziness] : no dizziness [Unsteady Walk] : no ataxia [de-identified] : will hold on to walls if available when walking

## 2023-07-17 ENCOUNTER — APPOINTMENT (OUTPATIENT)
Age: 63
End: 2023-07-17
Payer: MEDICARE

## 2023-07-17 DIAGNOSIS — M54.9 DORSALGIA, UNSPECIFIED: ICD-10-CM

## 2023-07-17 PROCEDURE — 99214 OFFICE O/P EST MOD 30 MIN: CPT | Mod: GE,95

## 2023-07-18 NOTE — HISTORY OF PRESENT ILLNESS
[Home] : at home, [unfilled] , at the time of the visit. [Medical Office: ([facility])___] : at the medical office located in  [Family Member] : family member [Verbal consent obtained from patient] : the patient, [unfilled] [FreeTextEntry8] : 63M hx anoxic brain injury (2009; 2/2 hypoglycemic coma), DM2 (A1c 8.5 on 4/2023), and HLD presenting to a telehealth visit for an acute complaint of Upper back pain. Patient accompanied by his daughter on the video chat. According to the patient, he has been having "discomfort" on his upper back for the past 3 weeks. The pain is intermittent and tends to last for a few minutes. No radiation of pain. No trauma or falls. He has noticed that since changing the chair that he sits on when he eats, and changing the mattress - the pain has improved. He has taken motrin 3 times over the past 3 weeks which have helped the pain. He recently saw PM&R in May 2023, and he was given heat packs which alleviated the pain, and he was able to complete the Physical therapy with no problems. He has no neurological changes or concerns. Denies Tingling or numbness. Denies recent fevers, chills, sob, cp, abd pain, n/v, gait instability, diarrhea.\par \par

## 2023-07-18 NOTE — PHYSICAL EXAM
[No Acute Distress] : no acute distress [Well Nourished] : well nourished [Well Developed] : well developed [Well-Appearing] : well-appearing [No Respiratory Distress] : no respiratory distress  [No Accessory Muscle Use] : no accessory muscle use [de-identified] : Speaking in full sentences

## 2023-07-18 NOTE — ASSESSMENT
[FreeTextEntry1] : 63M hx anoxic brain injury (2009; 2/2 hypoglycemic coma), DM2 (A1c 8.5 on 4/2023), and HLD presenting to a telehealth visit for an acute complaint of Upper back pain. \par \par #Back pain \par - Pain has been occurring for past 3 weeks with no red flag symptoms, no new neurological signs and/or symptoms, and positional in nature. Pain has been improving after using heat packs, Motrin and changing chair and mattress. Upper back pain does not radiate. Likely MSK in etiology. \par - Would recommend supportive treatment with heating packs, massage and PT (patient follows routinely.) \par - Patient tried Motrin with good relief. Would continue Tylenol OTC or NSAIDS OTC as needed\par - No need for imaging at this time. Counseled patient and family member on "red flag" signs and if experiencing them should seek immediate medical attention. \par \par D/w Dr. Cabrera \par \par

## 2023-07-18 NOTE — REVIEW OF SYSTEMS
[Back Pain] : back pain [Fever] : no fever [Chills] : no chills [Night Sweats] : no night sweats [Recent Change In Weight] : ~T no recent weight change [Chest Pain] : no chest pain [Orthopena] : no orthopnea [Shortness Of Breath] : no shortness of breath [Cough] : no cough [Abdominal Pain] : no abdominal pain [Nausea] : no nausea [Vomiting] : no vomiting [Joint Pain] : no joint pain [Joint Stiffness] : no joint stiffness [Muscle Pain] : no muscle pain [Muscle Weakness] : no muscle weakness [Joint Swelling] : no joint swelling [Skin Rash] : no skin rash [Headache] : no headache [Dizziness] : no dizziness [Fainting] : no fainting [Confusion] : no confusion [Unsteady Walk] : no ataxia [Memory Loss] : no memory loss

## 2023-07-18 NOTE — HISTORY OF PRESENT ILLNESS
[Home] : at home, [unfilled] , at the time of the visit. [Medical Office: ([facility])___] : at the medical office located in  [Family Member] : family member [Verbal consent obtained from patient] : the patient, [unfilled] [FreeTextEntry8] : 63M hx anoxic brain injury (2009; 2/2 hypoglycemic coma), DM2 (A1c 8.5 on 4/2023), and HLD presenting to a telehealth visit for an acute complaint of Upper back pain. Patient accompanied by his daughter on the video chat. According to the patient, he has been having "discomfort" on his upper back for the past 3 weeks. The pain is intermittent and tends to last for a few minutes. No radiation of pain. No trauma or falls. He has noticed that since changing the chair that he sits on when he eats, and changing the mattress - the pain has improved. He has taken motrin 3 times over the past 3 weeks which have helped the pain. He recently saw PM&R in May 2023, and he was given heat packs which alleviated the pain, and he was able to complete the Physical therapy with no problems. He has no neurological changes or concerns. Denies Tingling or numbness. Denies recent fevers, chills, sob, cp, abd pain, n/v, gait instability, diarrhea.\par \par

## 2023-07-18 NOTE — PHYSICAL EXAM
[No Acute Distress] : no acute distress [Well Nourished] : well nourished [Well Developed] : well developed [Well-Appearing] : well-appearing [No Respiratory Distress] : no respiratory distress  [No Accessory Muscle Use] : no accessory muscle use [de-identified] : Speaking in full sentences

## 2023-09-20 ENCOUNTER — APPOINTMENT (OUTPATIENT)
Dept: INTERNAL MEDICINE | Facility: CLINIC | Age: 63
End: 2023-09-20
Payer: MEDICARE

## 2023-09-20 DIAGNOSIS — R05.9 COUGH, UNSPECIFIED: ICD-10-CM

## 2023-09-20 PROCEDURE — 99212 OFFICE O/P EST SF 10 MIN: CPT | Mod: GE,95

## 2023-09-20 PROCEDURE — 99202 OFFICE O/P NEW SF 15 MIN: CPT | Mod: GE,95

## 2023-10-20 NOTE — ED ADULT NURSE NOTE - NS ED NOTE ABUSE SUSPICION NEGLECT YN
Airway  Date/Time: 10/20/2023 7:50 AM  Urgency: elective    Airway not difficult    Staffing  Performed: CRNA   Authorized by: KATARZYNA Gaines    Performed by: KATARZYNA Gaines  Patient location during procedure: PACU    Indications and Patient Condition  Indications for airway management: anesthesia  Spontaneous ventilation: present  Sedation level: deep  Preoxygenated: yes  Patient position: sniffing  Mask difficulty assessment: 1 - vent by mask    Final Airway Details  Final airway type: supraglottic airway      Successful airway: classic  Size 4     Number of attempts at approach: 1          
No

## 2024-02-16 ENCOUNTER — APPOINTMENT (OUTPATIENT)
Dept: INTERNAL MEDICINE | Facility: CLINIC | Age: 64
End: 2024-02-16

## 2024-04-02 RX ORDER — FLUTICASONE PROPIONATE 50 UG/1
50 SPRAY, METERED NASAL
Qty: 48 | Refills: 0 | Status: ACTIVE | COMMUNITY
Start: 2019-06-25 | End: 1900-01-01

## 2024-04-03 ENCOUNTER — APPOINTMENT (OUTPATIENT)
Dept: INTERNAL MEDICINE | Facility: CLINIC | Age: 64
End: 2024-04-03

## 2024-05-08 ENCOUNTER — OUTPATIENT (OUTPATIENT)
Dept: OUTPATIENT SERVICES | Facility: HOSPITAL | Age: 64
LOS: 1 days | End: 2024-05-08
Payer: MEDICARE

## 2024-05-08 ENCOUNTER — APPOINTMENT (OUTPATIENT)
Dept: INTERNAL MEDICINE | Facility: CLINIC | Age: 64
End: 2024-05-08
Payer: MEDICARE

## 2024-05-08 VITALS
BODY MASS INDEX: 23.77 KG/M2 | HEART RATE: 73 BPM | SYSTOLIC BLOOD PRESSURE: 142 MMHG | HEIGHT: 70 IN | DIASTOLIC BLOOD PRESSURE: 92 MMHG | WEIGHT: 166 LBS | OXYGEN SATURATION: 97 %

## 2024-05-08 DIAGNOSIS — Z00.00 ENCOUNTER FOR GENERAL ADULT MEDICAL EXAMINATION W/OUT ABNORMAL FINDINGS: ICD-10-CM

## 2024-05-08 DIAGNOSIS — Z12.2 ENCOUNTER FOR SCREENING FOR MALIGNANT NEOPLASM OF RESPIRATORY ORGANS: ICD-10-CM

## 2024-05-08 DIAGNOSIS — G20.C PARKINSONISM, UNSPECIFIED: ICD-10-CM

## 2024-05-08 DIAGNOSIS — L02.31 CUTANEOUS ABSCESS OF BUTTOCK: Chronic | ICD-10-CM

## 2024-05-08 DIAGNOSIS — J34.2 DEVIATED NASAL SEPTUM: ICD-10-CM

## 2024-05-08 DIAGNOSIS — Z98.890 OTHER SPECIFIED POSTPROCEDURAL STATES: Chronic | ICD-10-CM

## 2024-05-08 DIAGNOSIS — I10 ESSENTIAL (PRIMARY) HYPERTENSION: ICD-10-CM

## 2024-05-08 PROCEDURE — 81001 URINALYSIS AUTO W/SCOPE: CPT

## 2024-05-08 PROCEDURE — 84156 ASSAY OF PROTEIN URINE: CPT

## 2024-05-08 PROCEDURE — 82570 ASSAY OF URINE CREATININE: CPT

## 2024-05-08 PROCEDURE — 80061 LIPID PANEL: CPT

## 2024-05-08 PROCEDURE — 85025 COMPLETE CBC W/AUTO DIFF WBC: CPT

## 2024-05-08 PROCEDURE — 99213 OFFICE O/P EST LOW 20 MIN: CPT | Mod: GE

## 2024-05-08 PROCEDURE — G0463: CPT

## 2024-05-08 PROCEDURE — 83036 HEMOGLOBIN GLYCOSYLATED A1C: CPT

## 2024-05-08 PROCEDURE — 80053 COMPREHEN METABOLIC PANEL: CPT

## 2024-05-08 PROCEDURE — 84154 ASSAY OF PSA FREE: CPT

## 2024-05-08 RX ORDER — ASPIRIN ENTERIC COATED TABLETS 81 MG 81 MG/1
81 TABLET, DELAYED RELEASE ORAL
Qty: 1 | Refills: 2 | Status: DISCONTINUED | COMMUNITY
Start: 2018-12-18 | End: 2024-05-08

## 2024-05-08 RX ORDER — CARBIDOPA AND LEVODOPA 25; 100 MG/1; MG/1
25-100 TABLET ORAL
Refills: 0 | Status: ACTIVE | COMMUNITY
Start: 2022-11-15

## 2024-05-08 RX ORDER — TIMOLOL MALEATE 2.5 MG/ML
0.25 SOLUTION/ DROPS OPHTHALMIC
Qty: 1 | Refills: 3 | Status: ACTIVE | COMMUNITY
Start: 2018-02-08 | End: 1900-01-01

## 2024-05-09 LAB
ALBUMIN SERPL ELPH-MCNC: 4.2 G/DL
ALP BLD-CCNC: 94 U/L
ALT SERPL-CCNC: 18 U/L
ANION GAP SERPL CALC-SCNC: 14 MMOL/L
APPEARANCE: CLEAR
AST SERPL-CCNC: 16 U/L
BACTERIA: NEGATIVE /HPF
BASOPHILS # BLD AUTO: 0.02 K/UL
BASOPHILS NFR BLD AUTO: 0.4 %
BILIRUB SERPL-MCNC: 0.6 MG/DL
BILIRUBIN URINE: ABNORMAL
BLOOD URINE: NEGATIVE
BUN SERPL-MCNC: 18 MG/DL
CALCIUM SERPL-MCNC: 9.9 MG/DL
CAST: 1 /LPF
CHLORIDE SERPL-SCNC: 100 MMOL/L
CHOLEST SERPL-MCNC: 204 MG/DL
CO2 SERPL-SCNC: 28 MMOL/L
COLOR: NORMAL
CREAT SERPL-MCNC: 1.33 MG/DL
CREAT SPEC-SCNC: 216 MG/DL
CREAT/PROT UR: 0.9 RATIO
EGFR: 59 ML/MIN/1.73M2
EOSINOPHIL # BLD AUTO: 0.19 K/UL
EOSINOPHIL NFR BLD AUTO: 3.5 %
EPITHELIAL CELLS: 1 /HPF
ESTIMATED AVERAGE GLUCOSE: 200 MG/DL
GLUCOSE QUALITATIVE U: >=1000 MG/DL
GLUCOSE SERPL-MCNC: 223 MG/DL
HBA1C MFR BLD HPLC: 8.6 %
HCT VFR BLD CALC: 35.4 %
HDLC SERPL-MCNC: 51 MG/DL
HGB BLD-MCNC: 11.5 G/DL
IMM GRANULOCYTES NFR BLD AUTO: 0.2 %
KETONES URINE: ABNORMAL MG/DL
LDLC SERPL CALC-MCNC: 128 MG/DL
LEUKOCYTE ESTERASE URINE: NEGATIVE
LYMPHOCYTES # BLD AUTO: 1.66 K/UL
LYMPHOCYTES NFR BLD AUTO: 30.5 %
MAN DIFF?: NORMAL
MCHC RBC-ENTMCNC: 29 PG
MCHC RBC-ENTMCNC: 32.5 GM/DL
MCV RBC AUTO: 89.4 FL
MICROSCOPIC-UA: NORMAL
MONOCYTES # BLD AUTO: 0.39 K/UL
MONOCYTES NFR BLD AUTO: 7.2 %
NEUTROPHILS # BLD AUTO: 3.17 K/UL
NEUTROPHILS NFR BLD AUTO: 58.2 %
NITRITE URINE: NEGATIVE
NONHDLC SERPL-MCNC: 153 MG/DL
PH URINE: 5.5
PLATELET # BLD AUTO: 225 K/UL
POTASSIUM SERPL-SCNC: 3.8 MMOL/L
PROT SERPL-MCNC: 7.2 G/DL
PROT UR-MCNC: 190 MG/DL
PROTEIN URINE: 100 MG/DL
PSA FREE FLD-MCNC: 17 %
PSA FREE SERPL-MCNC: 0.98 NG/ML
PSA SERPL-MCNC: 5.76 NG/ML
RBC # BLD: 3.96 M/UL
RBC # FLD: 13.7 %
RED BLOOD CELLS URINE: 0 /HPF
SODIUM SERPL-SCNC: 142 MMOL/L
SPECIFIC GRAVITY URINE: >1.03
TRIGL SERPL-MCNC: 140 MG/DL
UROBILINOGEN URINE: 1 MG/DL
WBC # FLD AUTO: 5.44 K/UL
WHITE BLOOD CELLS URINE: 0 /HPF

## 2024-05-09 RX ORDER — ATORVASTATIN CALCIUM 40 MG/1
40 TABLET, FILM COATED ORAL
Qty: 1 | Refills: 2 | Status: ACTIVE | COMMUNITY
Start: 2022-03-10 | End: 1900-01-01

## 2024-05-10 PROBLEM — G20.C PARKINSONIAN SYNDROME: Status: ACTIVE | Noted: 2022-11-16

## 2024-05-10 NOTE — HISTORY OF PRESENT ILLNESS
[FreeTextEntry1] : CPE [de-identified] : 61M with PMH T2DM, HTN, TBI/anoxic brain injury (2/2 hypoglycemic coma), and HLD returning for CPE. Accompanied by wife. History all provided by wife, with pt occasionally nodding in confirmation.  Overall no major issues. No interval major illnesses, ED visits, or hospitalizations.  Does report increased urination recently, believes its related to his HCTZ.  Have not been taking metformin regularly, particularly the night time dose since it makes him have increased bowel movements

## 2024-05-10 NOTE — PHYSICAL EXAM
[No Acute Distress] : no acute distress [Well-Appearing] : well-appearing [Normal Sclera/Conjunctiva] : normal sclera/conjunctiva [PERRL] : pupils equal round and reactive to light [Normal Outer Ear/Nose] : the outer ears and nose were normal in appearance [Normal Oropharynx] : the oropharynx was normal [Normal TMs] : both tympanic membranes were normal [No Lymphadenopathy] : no lymphadenopathy [Supple] : supple [Thyroid Normal, No Nodules] : the thyroid was normal and there were no nodules present [No Respiratory Distress] : no respiratory distress  [Clear to Auscultation] : lungs were clear to auscultation bilaterally [Normal Rate] : normal rate  [Regular Rhythm] : with a regular rhythm [Normal S1, S2] : normal S1 and S2 [No Murmur] : no murmur heard [No Edema] : there was no peripheral edema [Soft] : abdomen soft [Non Tender] : non-tender [Non-distended] : non-distended [No CVA Tenderness] : no CVA  tenderness [No Spinal Tenderness] : no spinal tenderness [de-identified] : flat affect, calm

## 2024-05-10 NOTE — ASSESSMENT
[FreeTextEntry1] : 61M PMH T2DM, HTN, TBI/anoxic brain injury (2/2 hypoglycemia coma) and HLD returning for CPE. Accompanied by wife.   #T2DM - POCT a1c 9.1% in office i/s/o metformin nonadherence  - discussed starting a second agent jardiance vs farxiga, however, wife deferring at this time. Wants to ensure pt taking metformin 1g BID prior to adding another agent since pt already on so many medications  - discussed checking FS's regularly and bringing log to next visit in 1 month, if still uncontrolled, would add on SGLT2i as above  - check UPCR - podiatry and ophtho f/u   #HTN requiring multiple agents. BP much better controlled today compared to prior, 142/92 - ideally would aim for  however wife very hesitant about increasing regimen at this time. suggested increasing labetalol to 600mg TID however wife deferring, prefers to wait until next visit after further lifestyle adjustments  - continue current HTN regimen (labetalol 500mg TID, amlodipine 10mg daily, lisinopril 40mg daily, HCTZ 12.5 mg daily)  #HLD on review on prior lipid panels, appears that LDL was never at goal of <70 given h/o DM, will increase atorvastatin to 40mg   #urinary frequency suspect due to uncontrolled DM. HCTZ may be contributing but would not remove diuretic given HTN requiring multiple agents  - can check UA, likely glucosuria  - f/u after better control of DM  #HCM - colon cancer screening UTD - last 7/2017, due in 2027  - lung cancer screening due, last 2020 w/ lung-RADS-2,  - TDAP vaccine reportedly UTD - shingles vaccine UTD - received 2 doses at pharm in Nov/Dec  - pneumonia vaccine: Prevnar 20 today to complete series  - routine labs: CBC, CMP, lipid profile, a1c - PHQ2/SBIRT negative   RTC in 1 month for DM and HTN f/u  Case discussed w/ Dr. Khan, Mateo Carter, PGY3  
Bladimir Rangel-

## 2024-05-10 NOTE — REVIEW OF SYSTEMS
[Fever] : no fever [Night Sweats] : no night sweats [Discharge] : no discharge [Vision Problems] : no vision problems [Earache] : no earache [Nasal Discharge] : no nasal discharge [Chest Pain] : no chest pain [Palpitations] : no palpitations [Shortness Of Breath] : no shortness of breath [Cough] : no cough [Abdominal Pain] : no abdominal pain [Vomiting] : no vomiting [Dysuria] : no dysuria [Hematuria] : no hematuria [Joint Pain] : no joint pain [Back Pain] : no back pain [Itching] : no itching [Skin Rash] : no skin rash [Headache] : no headache [Dizziness] : no dizziness

## 2024-05-20 DIAGNOSIS — Z12.2 ENCOUNTER FOR SCREENING FOR MALIGNANT NEOPLASM OF RESPIRATORY ORGANS: ICD-10-CM

## 2024-05-20 DIAGNOSIS — G20.C PARKINSONISM, UNSPECIFIED: ICD-10-CM

## 2024-05-20 DIAGNOSIS — J34.2 DEVIATED NASAL SEPTUM: ICD-10-CM

## 2024-05-20 DIAGNOSIS — E11.9 TYPE 2 DIABETES MELLITUS WITHOUT COMPLICATIONS: ICD-10-CM

## 2024-06-14 ENCOUNTER — APPOINTMENT (OUTPATIENT)
Dept: INTERNAL MEDICINE | Facility: CLINIC | Age: 64
End: 2024-06-14
Payer: MEDICARE

## 2024-06-14 ENCOUNTER — OUTPATIENT (OUTPATIENT)
Dept: OUTPATIENT SERVICES | Facility: HOSPITAL | Age: 64
LOS: 1 days | End: 2024-06-14
Payer: MEDICARE

## 2024-06-14 VITALS
HEIGHT: 70 IN | BODY MASS INDEX: 21.76 KG/M2 | DIASTOLIC BLOOD PRESSURE: 88 MMHG | SYSTOLIC BLOOD PRESSURE: 160 MMHG | WEIGHT: 152 LBS | OXYGEN SATURATION: 98 % | HEART RATE: 74 BPM

## 2024-06-14 VITALS — WEIGHT: 165 LBS | BODY MASS INDEX: 23.68 KG/M2

## 2024-06-14 VITALS — SYSTOLIC BLOOD PRESSURE: 144 MMHG | DIASTOLIC BLOOD PRESSURE: 88 MMHG

## 2024-06-14 DIAGNOSIS — L02.31 CUTANEOUS ABSCESS OF BUTTOCK: Chronic | ICD-10-CM

## 2024-06-14 DIAGNOSIS — Z98.890 OTHER SPECIFIED POSTPROCEDURAL STATES: Chronic | ICD-10-CM

## 2024-06-14 DIAGNOSIS — I10 ESSENTIAL (PRIMARY) HYPERTENSION: ICD-10-CM

## 2024-06-14 DIAGNOSIS — E11.9 TYPE 2 DIABETES MELLITUS W/OUT COMPLICATIONS: ICD-10-CM

## 2024-06-14 DIAGNOSIS — R97.20 ELEVATED PROSTATE, SPECIFIC ANTIGEN [PSA]: ICD-10-CM

## 2024-06-14 DIAGNOSIS — Z93.1 GASTROSTOMY STATUS: Chronic | ICD-10-CM

## 2024-06-14 PROCEDURE — 83036 HEMOGLOBIN GLYCOSYLATED A1C: CPT

## 2024-06-14 PROCEDURE — G0463: CPT

## 2024-06-14 PROCEDURE — 99213 OFFICE O/P EST LOW 20 MIN: CPT | Mod: GE

## 2024-06-14 RX ORDER — HYDROCHLOROTHIAZIDE 12.5 MG/1
12.5 TABLET ORAL
Qty: 90 | Refills: 3 | Status: DISCONTINUED | COMMUNITY
Start: 2022-11-16 | End: 2024-06-14

## 2024-06-14 RX ORDER — CHLORTHALIDONE 25 MG/1
25 TABLET ORAL DAILY
Qty: 30 | Refills: 1 | Status: ACTIVE | COMMUNITY
Start: 2024-06-14 | End: 1900-01-01

## 2024-06-14 NOTE — PHYSICAL EXAM
[No Acute Distress] : no acute distress [Well Nourished] : well nourished [EOMI] : extraocular movements intact [Normal Outer Ear/Nose] : the outer ears and nose were normal in appearance [No JVD] : no jugular venous distention [No Respiratory Distress] : no respiratory distress  [Clear to Auscultation] : lungs were clear to auscultation bilaterally [Normal Rate] : normal rate  [Regular Rhythm] : with a regular rhythm

## 2024-06-17 ENCOUNTER — APPOINTMENT (OUTPATIENT)
Dept: UROLOGY | Facility: HOSPITAL | Age: 64
End: 2024-06-17

## 2024-06-17 ENCOUNTER — OUTPATIENT (OUTPATIENT)
Dept: OUTPATIENT SERVICES | Facility: HOSPITAL | Age: 64
LOS: 1 days | End: 2024-06-17
Payer: MEDICARE

## 2024-06-17 VITALS
RESPIRATION RATE: 18 BRPM | OXYGEN SATURATION: 98 % | TEMPERATURE: 97.9 F | DIASTOLIC BLOOD PRESSURE: 76 MMHG | SYSTOLIC BLOOD PRESSURE: 169 MMHG | HEART RATE: 66 BPM

## 2024-06-17 DIAGNOSIS — L02.31 CUTANEOUS ABSCESS OF BUTTOCK: Chronic | ICD-10-CM

## 2024-06-17 DIAGNOSIS — Z93.1 GASTROSTOMY STATUS: Chronic | ICD-10-CM

## 2024-06-17 DIAGNOSIS — R97.20 ELEVATED PROSTATE SPECIFIC ANTIGEN [PSA]: ICD-10-CM

## 2024-06-17 DIAGNOSIS — R30.0 DYSURIA: ICD-10-CM

## 2024-06-17 DIAGNOSIS — Z98.890 OTHER SPECIFIED POSTPROCEDURAL STATES: Chronic | ICD-10-CM

## 2024-06-17 DIAGNOSIS — R97.20 ELEVATED PROSTATE, SPECIFIC ANTIGEN [PSA]: ICD-10-CM

## 2024-06-17 LAB
ESTIMATED AVERAGE GLUCOSE: 151 MG/DL
HBA1C MFR BLD HPLC: 6.9 %

## 2024-06-17 PROCEDURE — G0463: CPT

## 2024-06-17 NOTE — ASSESSMENT
[FreeTextEntry1] : 64 year old male with elevated PSA, strong family history of prostate cancer - MRI of prostate with and without contrast - if high risk feature will plan for MRI prostate - f/u 3 weeks or after MRI done

## 2024-06-17 NOTE — HISTORY OF PRESENT ILLNESS
[FreeTextEntry1] : Diabetes follow up [de-identified] : 64M Hx T2DM HTN Anoxic brain injury,HLD, and elevated PSA here for f/u for T2DM and BP check.  Glucose before bed has been in the 200s. BP at home in 140s systolic. Appetite has been good and energy level has been good. No fevers night sweats, chills or altered mental status from his baseline.

## 2024-06-17 NOTE — HISTORY OF PRESENT ILLNESS
[None] : no symptoms [FreeTextEntry1] : 64 year old male referred to Urology for elevated PSA of 5.7.  Patient has family history of prostate cancer, his father and brother (brother alive, father - unclear if  from prostate cancer).  He has not had a prostate bx in the past   Patient denies any voiding complaints.  Denies hematuria, dysuria, urgency or frequency.  He wears a diaper because he cannot reach bathroom in time to void due to motor defects.    PVR today was 33cc.

## 2024-06-17 NOTE — PHYSICAL EXAM
[Prostate Enlargement] : the prostate was not enlarged [Prostate Tenderness] : the prostate was not tender [No Prostate Nodules] : no prostate nodules

## 2024-06-17 NOTE — ASSESSMENT
[FreeTextEntry1] : 64M with T2DM HTN TBI/anoxic brain injury 2/2 hypoglycemic coma and HLD   #T2DM  - Last A1C 8.6 in 5/8/2024; iso metformin nonadherence  - Metformin 1g BID. Will add Jardiance or Farxiga if no improvement  - c/w Atorvastatin  - f/u A1C   #Hypertension  - previously on Amlodipine 10 QD, HCTz 12.5 QD, Labetalol 500, Lisinopril 40  - Suboptimal BP while on above regimen - Switch HCTZ for Chlorthalidone 25 QD  - f/u in 5 weeks for BP check and BMP   #Elevated PSA  - PSA elevated to 5s in recent visit  - follow up w/ Urology   f/u in 5 weeks for BP check and BMP   d/w Dr. Ortez

## 2024-06-24 DIAGNOSIS — E11.9 TYPE 2 DIABETES MELLITUS WITHOUT COMPLICATIONS: ICD-10-CM

## 2024-06-24 DIAGNOSIS — R97.20 ELEVATED PROSTATE SPECIFIC ANTIGEN [PSA]: ICD-10-CM

## 2024-07-14 ENCOUNTER — OUTPATIENT (OUTPATIENT)
Dept: OUTPATIENT SERVICES | Facility: HOSPITAL | Age: 64
LOS: 1 days | End: 2024-07-14
Payer: MEDICARE

## 2024-07-14 DIAGNOSIS — Z98.890 OTHER SPECIFIED POSTPROCEDURAL STATES: Chronic | ICD-10-CM

## 2024-07-14 DIAGNOSIS — L02.31 CUTANEOUS ABSCESS OF BUTTOCK: Chronic | ICD-10-CM

## 2024-07-14 DIAGNOSIS — Z93.1 GASTROSTOMY STATUS: Chronic | ICD-10-CM

## 2024-07-14 DIAGNOSIS — R97.20 ELEVATED PROSTATE SPECIFIC ANTIGEN [PSA]: ICD-10-CM

## 2024-07-14 PROCEDURE — 72197 MRI PELVIS W/O & W/DYE: CPT

## 2024-07-14 PROCEDURE — 76498 UNLISTED MR PROCEDURE: CPT

## 2024-07-14 PROCEDURE — A9585: CPT

## 2024-08-26 ENCOUNTER — OUTPATIENT (OUTPATIENT)
Dept: OUTPATIENT SERVICES | Facility: HOSPITAL | Age: 64
LOS: 1 days | End: 2024-08-26
Payer: MEDICAID

## 2024-08-26 DIAGNOSIS — L02.31 CUTANEOUS ABSCESS OF BUTTOCK: Chronic | ICD-10-CM

## 2024-08-26 DIAGNOSIS — R97.20 ELEVATED PROSTATE SPECIFIC ANTIGEN [PSA]: ICD-10-CM

## 2024-08-26 DIAGNOSIS — Z98.890 OTHER SPECIFIED POSTPROCEDURAL STATES: Chronic | ICD-10-CM

## 2024-08-26 DIAGNOSIS — Z93.1 GASTROSTOMY STATUS: Chronic | ICD-10-CM

## 2024-08-26 PROCEDURE — C8001: CPT

## 2024-08-30 ENCOUNTER — APPOINTMENT (OUTPATIENT)
Dept: UROLOGY | Facility: CLINIC | Age: 64
End: 2024-08-30

## 2024-08-30 VITALS — DIASTOLIC BLOOD PRESSURE: 85 MMHG | OXYGEN SATURATION: 98 % | HEART RATE: 76 BPM | SYSTOLIC BLOOD PRESSURE: 151 MMHG

## 2024-09-11 ENCOUNTER — RX RENEWAL (OUTPATIENT)
Age: 64
End: 2024-09-11

## 2024-10-16 ENCOUNTER — RESULT CHARGE (OUTPATIENT)
Age: 64
End: 2024-10-16

## 2024-10-16 ENCOUNTER — APPOINTMENT (OUTPATIENT)
Dept: INTERNAL MEDICINE | Facility: CLINIC | Age: 64
End: 2024-10-16
Payer: MEDICARE

## 2024-10-16 ENCOUNTER — MED ADMIN CHARGE (OUTPATIENT)
Age: 64
End: 2024-10-16

## 2024-10-16 ENCOUNTER — OUTPATIENT (OUTPATIENT)
Dept: OUTPATIENT SERVICES | Facility: HOSPITAL | Age: 64
LOS: 1 days | End: 2024-10-16
Payer: MEDICARE

## 2024-10-16 VITALS
HEART RATE: 60 BPM | OXYGEN SATURATION: 95 % | HEIGHT: 70 IN | DIASTOLIC BLOOD PRESSURE: 80 MMHG | WEIGHT: 158 LBS | BODY MASS INDEX: 22.62 KG/M2 | SYSTOLIC BLOOD PRESSURE: 138 MMHG

## 2024-10-16 VITALS — SYSTOLIC BLOOD PRESSURE: 140 MMHG | DIASTOLIC BLOOD PRESSURE: 81 MMHG

## 2024-10-16 DIAGNOSIS — E11.9 TYPE 2 DIABETES MELLITUS W/OUT COMPLICATIONS: ICD-10-CM

## 2024-10-16 DIAGNOSIS — Z98.890 OTHER SPECIFIED POSTPROCEDURAL STATES: Chronic | ICD-10-CM

## 2024-10-16 DIAGNOSIS — R97.20 ELEVATED PROSTATE, SPECIFIC ANTIGEN [PSA]: ICD-10-CM

## 2024-10-16 DIAGNOSIS — Z93.1 GASTROSTOMY STATUS: Chronic | ICD-10-CM

## 2024-10-16 DIAGNOSIS — I10 ESSENTIAL (PRIMARY) HYPERTENSION: ICD-10-CM

## 2024-10-16 DIAGNOSIS — L02.31 CUTANEOUS ABSCESS OF BUTTOCK: Chronic | ICD-10-CM

## 2024-10-16 LAB — HBA1C MFR BLD HPLC: 7.6

## 2024-10-16 PROCEDURE — 99213 OFFICE O/P EST LOW 20 MIN: CPT | Mod: GE

## 2024-10-17 LAB
ANION GAP SERPL CALC-SCNC: 12 MMOL/L
BUN SERPL-MCNC: 20 MG/DL
CALCIUM SERPL-MCNC: 9.9 MG/DL
CHLORIDE SERPL-SCNC: 102 MMOL/L
CO2 SERPL-SCNC: 30 MMOL/L
CREAT SERPL-MCNC: 1.23 MG/DL
EGFR: 65 ML/MIN/1.73M2
GLUCOSE SERPL-MCNC: 291 MG/DL
MAGNESIUM SERPL-MCNC: 2 MG/DL
POTASSIUM SERPL-SCNC: 4 MMOL/L
SODIUM SERPL-SCNC: 144 MMOL/L

## 2024-10-21 DIAGNOSIS — E11.9 TYPE 2 DIABETES MELLITUS WITHOUT COMPLICATIONS: ICD-10-CM

## 2024-10-21 DIAGNOSIS — R97.20 ELEVATED PROSTATE SPECIFIC ANTIGEN [PSA]: ICD-10-CM

## 2024-10-21 DIAGNOSIS — Z23 ENCOUNTER FOR IMMUNIZATION: ICD-10-CM

## 2024-11-20 PROCEDURE — 85610 PROTHROMBIN TIME: CPT

## 2024-11-20 PROCEDURE — 83735 ASSAY OF MAGNESIUM: CPT

## 2024-11-20 PROCEDURE — G0463: CPT

## 2024-11-20 PROCEDURE — 80048 BASIC METABOLIC PNL TOTAL CA: CPT

## 2024-11-20 PROCEDURE — G0008: CPT

## 2024-11-20 PROCEDURE — 90662 IIV NO PRSV INCREASED AG IM: CPT

## 2025-02-24 ENCOUNTER — APPOINTMENT (OUTPATIENT)
Dept: PODIATRY | Facility: HOSPITAL | Age: 65
End: 2025-02-24
Payer: MEDICARE

## 2025-02-24 ENCOUNTER — OUTPATIENT (OUTPATIENT)
Dept: OUTPATIENT SERVICES | Facility: HOSPITAL | Age: 65
LOS: 1 days | End: 2025-02-24
Payer: MEDICARE

## 2025-02-24 VITALS
DIASTOLIC BLOOD PRESSURE: 79 MMHG | TEMPERATURE: 98 F | OXYGEN SATURATION: 98 % | RESPIRATION RATE: 18 BRPM | SYSTOLIC BLOOD PRESSURE: 170 MMHG | HEART RATE: 65 BPM | HEIGHT: 72 IN

## 2025-02-24 DIAGNOSIS — L02.31 CUTANEOUS ABSCESS OF BUTTOCK: Chronic | ICD-10-CM

## 2025-02-24 DIAGNOSIS — B35.1 TINEA UNGUIUM: ICD-10-CM

## 2025-02-24 DIAGNOSIS — E11.9 TYPE 2 DIABETES MELLITUS WITHOUT COMPLICATIONS: ICD-10-CM

## 2025-02-24 DIAGNOSIS — Z98.890 OTHER SPECIFIED POSTPROCEDURAL STATES: Chronic | ICD-10-CM

## 2025-02-24 DIAGNOSIS — E11.9 TYPE 2 DIABETES MELLITUS W/OUT COMPLICATIONS: ICD-10-CM

## 2025-02-24 DIAGNOSIS — M79.609 PAIN IN UNSPECIFIED LIMB: ICD-10-CM

## 2025-02-24 DIAGNOSIS — Z93.1 GASTROSTOMY STATUS: Chronic | ICD-10-CM

## 2025-02-24 PROCEDURE — 99203 OFFICE O/P NEW LOW 30 MIN: CPT

## 2025-02-24 PROCEDURE — G0463: CPT

## 2025-04-09 ENCOUNTER — APPOINTMENT (OUTPATIENT)
Dept: INTERNAL MEDICINE | Facility: CLINIC | Age: 65
End: 2025-04-09
Payer: MEDICARE

## 2025-04-09 ENCOUNTER — OUTPATIENT (OUTPATIENT)
Dept: OUTPATIENT SERVICES | Facility: HOSPITAL | Age: 65
LOS: 1 days | End: 2025-04-09
Payer: MEDICARE

## 2025-04-09 VITALS
HEART RATE: 69 BPM | SYSTOLIC BLOOD PRESSURE: 158 MMHG | HEIGHT: 72 IN | BODY MASS INDEX: 22.35 KG/M2 | OXYGEN SATURATION: 91 % | DIASTOLIC BLOOD PRESSURE: 80 MMHG | WEIGHT: 165 LBS

## 2025-04-09 DIAGNOSIS — I10 ESSENTIAL (PRIMARY) HYPERTENSION: ICD-10-CM

## 2025-04-09 DIAGNOSIS — B35.1 TINEA UNGUIUM: ICD-10-CM

## 2025-04-09 DIAGNOSIS — E11.9 TYPE 2 DIABETES MELLITUS W/OUT COMPLICATIONS: ICD-10-CM

## 2025-04-09 DIAGNOSIS — R26.89 OTHER ABNORMALITIES OF GAIT AND MOBILITY: ICD-10-CM

## 2025-04-09 DIAGNOSIS — Z00.00 ENCOUNTER FOR GENERAL ADULT MEDICAL EXAMINATION W/OUT ABNORMAL FINDINGS: ICD-10-CM

## 2025-04-09 DIAGNOSIS — R97.20 ELEVATED PROSTATE, SPECIFIC ANTIGEN [PSA]: ICD-10-CM

## 2025-04-09 DIAGNOSIS — Z93.1 GASTROSTOMY STATUS: Chronic | ICD-10-CM

## 2025-04-09 DIAGNOSIS — Z98.890 OTHER SPECIFIED POSTPROCEDURAL STATES: Chronic | ICD-10-CM

## 2025-04-09 DIAGNOSIS — L02.31 CUTANEOUS ABSCESS OF BUTTOCK: Chronic | ICD-10-CM

## 2025-04-09 PROCEDURE — 84153 ASSAY OF PSA TOTAL: CPT

## 2025-04-09 PROCEDURE — G2211 COMPLEX E/M VISIT ADD ON: CPT

## 2025-04-09 PROCEDURE — 80061 LIPID PANEL: CPT

## 2025-04-09 PROCEDURE — 82043 UR ALBUMIN QUANTITATIVE: CPT

## 2025-04-09 PROCEDURE — 84154 ASSAY OF PSA FREE: CPT

## 2025-04-09 PROCEDURE — 99213 OFFICE O/P EST LOW 20 MIN: CPT | Mod: GE

## 2025-04-09 PROCEDURE — 83036 HEMOGLOBIN GLYCOSYLATED A1C: CPT

## 2025-04-09 PROCEDURE — 85025 COMPLETE CBC W/AUTO DIFF WBC: CPT

## 2025-04-09 PROCEDURE — G0463: CPT

## 2025-04-09 PROCEDURE — 80053 COMPREHEN METABOLIC PANEL: CPT

## 2025-04-09 RX ORDER — TERBINAFINE HYDROCHLORIDE 250 MG/1
250 TABLET ORAL DAILY
Qty: 30 | Refills: 2 | Status: ACTIVE | COMMUNITY
Start: 2025-04-09 | End: 1900-01-01

## 2025-04-10 LAB
ALBUMIN SERPL ELPH-MCNC: 4.3 G/DL
ALP BLD-CCNC: 103 U/L
ALT SERPL-CCNC: 11 U/L
ANION GAP SERPL CALC-SCNC: 22 MMOL/L
AST SERPL-CCNC: 20 U/L
BASOPHILS # BLD AUTO: 0.04 K/UL
BASOPHILS NFR BLD AUTO: 0.7 %
BILIRUB SERPL-MCNC: 0.3 MG/DL
BUN SERPL-MCNC: 26 MG/DL
CALCIUM SERPL-MCNC: 10 MG/DL
CHLORIDE SERPL-SCNC: 97 MMOL/L
CHOLEST SERPL-MCNC: 181 MG/DL
CHOLEST/HDLC SERPL: 4 RATIO
CO2 SERPL-SCNC: 22 MMOL/L
CREAT SERPL-MCNC: 1.49 MG/DL
CREAT SPEC-SCNC: 159 MG/DL
EGFRCR SERPLBLD CKD-EPI 2021: 51 ML/MIN/1.73M2
EOSINOPHIL # BLD AUTO: 0.28 K/UL
EOSINOPHIL NFR BLD AUTO: 5 %
GLUCOSE SERPL-MCNC: 268 MG/DL
HCT VFR BLD CALC: 36.9 %
HDLC SERPL-MCNC: 45 MG/DL
HGB BLD-MCNC: 12.1 G/DL
IMM GRANULOCYTES NFR BLD AUTO: 0.2 %
LDLC SERPL-MCNC: 111 MG/DL
LYMPHOCYTES # BLD AUTO: 1.58 K/UL
LYMPHOCYTES NFR BLD AUTO: 28.2 %
MAN DIFF?: NORMAL
MCHC RBC-ENTMCNC: 29.8 PG
MCHC RBC-ENTMCNC: 32.8 G/DL
MCV RBC AUTO: 90.9 FL
MICROALBUMIN 24H UR DL<=1MG/L-MCNC: 55.2 MG/DL
MICROALBUMIN/CREAT 24H UR-RTO: 347 MG/G
MONOCYTES # BLD AUTO: 0.36 K/UL
MONOCYTES NFR BLD AUTO: 6.4 %
NEUTROPHILS # BLD AUTO: 3.34 K/UL
NEUTROPHILS NFR BLD AUTO: 59.5 %
NONHDLC SERPL-MCNC: 136 MG/DL
PLATELET # BLD AUTO: 267 K/UL
POTASSIUM SERPL-SCNC: 3.7 MMOL/L
PROT SERPL-MCNC: 7.5 G/DL
PSA FREE FLD-MCNC: 13 %
PSA FREE SERPL-MCNC: 0.98 NG/ML
PSA SERPL-MCNC: 7.25 NG/ML
RBC # BLD: 4.06 M/UL
RBC # FLD: 13.2 %
SODIUM SERPL-SCNC: 141 MMOL/L
TRIGL SERPL-MCNC: 142 MG/DL
WBC # FLD AUTO: 5.61 K/UL

## 2025-04-15 DIAGNOSIS — B35.1 TINEA UNGUIUM: ICD-10-CM

## 2025-04-15 DIAGNOSIS — E11.9 TYPE 2 DIABETES MELLITUS WITHOUT COMPLICATIONS: ICD-10-CM

## 2025-04-15 DIAGNOSIS — R26.89 OTHER ABNORMALITIES OF GAIT AND MOBILITY: ICD-10-CM

## 2025-04-15 DIAGNOSIS — R97.20 ELEVATED PROSTATE SPECIFIC ANTIGEN [PSA]: ICD-10-CM

## 2025-04-30 NOTE — ED ADULT NURSE NOTE - NS ED NOTE ABUSE SUSPICION NEGLECT YN
Routing refill request to provider for review/approval because:  Drug not on the [facility] or [facility] refill protocol or controlled substance    
 Disp Refills Start End JASVIR   chlorhexidine (PERIDEX) 0.12 % solution 473 mL 3 2/27/2025 -- No     Last Office Visit: 04/22/2025  Future Office visit:    Next 5 appointments (look out 90 days)      May 16, 2025 9:30 AM  (Arrive by 9:15 AM)  Return Visit with CANDE Schmidt CNP  [facility] ([facility] [place] [facility]  Arrive at:  PSYCHIATRY [address] [place] [id number] [phone]             Routing refill request to provider for review/approval because:    
No

## 2025-06-02 ENCOUNTER — APPOINTMENT (OUTPATIENT)
Dept: PODIATRY | Facility: HOSPITAL | Age: 65
End: 2025-06-02

## 2025-06-02 ENCOUNTER — NON-APPOINTMENT (OUTPATIENT)
Age: 65
End: 2025-06-02

## 2025-06-03 ENCOUNTER — APPOINTMENT (OUTPATIENT)
Dept: UROLOGY | Facility: CLINIC | Age: 65
End: 2025-06-03
Payer: MEDICARE

## 2025-06-03 VITALS
RESPIRATION RATE: 18 BRPM | DIASTOLIC BLOOD PRESSURE: 78 MMHG | HEART RATE: 74 BPM | TEMPERATURE: 98 F | SYSTOLIC BLOOD PRESSURE: 154 MMHG | WEIGHT: 165 LBS | HEIGHT: 72 IN | BODY MASS INDEX: 22.35 KG/M2 | OXYGEN SATURATION: 97 %

## 2025-06-03 DIAGNOSIS — R97.20 ELEVATED PROSTATE, SPECIFIC ANTIGEN [PSA]: ICD-10-CM

## 2025-06-03 PROCEDURE — 99203 OFFICE O/P NEW LOW 30 MIN: CPT

## 2025-06-05 LAB — BACTERIA UR CULT: NORMAL

## 2025-06-16 ENCOUNTER — APPOINTMENT (OUTPATIENT)
Dept: INTERNAL MEDICINE | Facility: CLINIC | Age: 65
End: 2025-06-16

## 2025-06-17 ENCOUNTER — NON-APPOINTMENT (OUTPATIENT)
Age: 65
End: 2025-06-17

## 2025-06-29 ENCOUNTER — OUTPATIENT (OUTPATIENT)
Dept: OUTPATIENT SERVICES | Facility: HOSPITAL | Age: 65
LOS: 1 days | End: 2025-06-29
Payer: MEDICARE

## 2025-06-29 DIAGNOSIS — Z93.1 GASTROSTOMY STATUS: Chronic | ICD-10-CM

## 2025-06-29 DIAGNOSIS — R97.20 ELEVATED PROSTATE SPECIFIC ANTIGEN [PSA]: ICD-10-CM

## 2025-06-29 DIAGNOSIS — L02.31 CUTANEOUS ABSCESS OF BUTTOCK: Chronic | ICD-10-CM

## 2025-06-29 DIAGNOSIS — Z98.890 OTHER SPECIFIED POSTPROCEDURAL STATES: Chronic | ICD-10-CM

## 2025-06-29 PROCEDURE — 76498 UNLISTED MR PROCEDURE: CPT

## 2025-06-29 PROCEDURE — A9585: CPT

## 2025-06-29 PROCEDURE — 72197 MRI PELVIS W/O & W/DYE: CPT

## 2025-07-11 ENCOUNTER — NON-APPOINTMENT (OUTPATIENT)
Age: 65
End: 2025-07-11

## 2025-07-11 ENCOUNTER — OUTPATIENT (OUTPATIENT)
Dept: OUTPATIENT SERVICES | Facility: HOSPITAL | Age: 65
LOS: 1 days | End: 2025-07-11
Payer: MEDICARE

## 2025-07-11 ENCOUNTER — RESULT REVIEW (OUTPATIENT)
Age: 65
End: 2025-07-11

## 2025-07-11 DIAGNOSIS — R97.20 ELEVATED PROSTATE SPECIFIC ANTIGEN [PSA]: ICD-10-CM

## 2025-07-11 DIAGNOSIS — L02.31 CUTANEOUS ABSCESS OF BUTTOCK: Chronic | ICD-10-CM

## 2025-07-11 DIAGNOSIS — Z93.1 GASTROSTOMY STATUS: Chronic | ICD-10-CM

## 2025-07-11 DIAGNOSIS — Z98.890 OTHER SPECIFIED POSTPROCEDURAL STATES: Chronic | ICD-10-CM

## 2025-07-11 PROCEDURE — 76377 3D RENDER W/INTRP POSTPROCES: CPT | Mod: 26

## 2025-07-11 PROCEDURE — C8001: CPT

## 2025-08-22 ENCOUNTER — NON-APPOINTMENT (OUTPATIENT)
Age: 65
End: 2025-08-22

## 2025-08-22 ENCOUNTER — OUTPATIENT (OUTPATIENT)
Dept: OUTPATIENT SERVICES | Facility: HOSPITAL | Age: 65
LOS: 1 days | End: 2025-08-22

## 2025-08-22 VITALS
DIASTOLIC BLOOD PRESSURE: 84 MMHG | HEART RATE: 62 BPM | TEMPERATURE: 98 F | OXYGEN SATURATION: 98 % | SYSTOLIC BLOOD PRESSURE: 142 MMHG | HEIGHT: 72 IN | RESPIRATION RATE: 16 BRPM | WEIGHT: 177.91 LBS

## 2025-08-22 DIAGNOSIS — R97.20 ELEVATED PROSTATE SPECIFIC ANTIGEN [PSA]: ICD-10-CM

## 2025-08-22 DIAGNOSIS — Z86.69 PERSONAL HISTORY OF OTHER DISEASES OF THE NERVOUS SYSTEM AND SENSE ORGANS: ICD-10-CM

## 2025-08-22 DIAGNOSIS — E11.9 TYPE 2 DIABETES MELLITUS WITHOUT COMPLICATIONS: ICD-10-CM

## 2025-08-22 DIAGNOSIS — I10 ESSENTIAL (PRIMARY) HYPERTENSION: ICD-10-CM

## 2025-08-22 DIAGNOSIS — L02.31 CUTANEOUS ABSCESS OF BUTTOCK: Chronic | ICD-10-CM

## 2025-08-22 DIAGNOSIS — Z98.890 OTHER SPECIFIED POSTPROCEDURAL STATES: Chronic | ICD-10-CM

## 2025-08-22 DIAGNOSIS — Z93.1 GASTROSTOMY STATUS: Chronic | ICD-10-CM

## 2025-08-22 RX ORDER — LABETALOL HYDROCHLORIDE 200 MG/1
1 TABLET, FILM COATED ORAL
Refills: 0 | DISCHARGE

## 2025-08-23 LAB
CULTURE RESULTS: ABNORMAL
SPECIMEN SOURCE: SIGNIFICANT CHANGE UP

## 2025-08-25 ENCOUNTER — APPOINTMENT (OUTPATIENT)
Dept: INTERNAL MEDICINE | Facility: CLINIC | Age: 65
End: 2025-08-25
Payer: MEDICARE

## 2025-08-25 ENCOUNTER — LABORATORY RESULT (OUTPATIENT)
Age: 65
End: 2025-08-25

## 2025-08-25 ENCOUNTER — OUTPATIENT (OUTPATIENT)
Dept: OUTPATIENT SERVICES | Facility: HOSPITAL | Age: 65
LOS: 1 days | End: 2025-08-25
Payer: MEDICARE

## 2025-08-25 VITALS
HEART RATE: 68 BPM | SYSTOLIC BLOOD PRESSURE: 160 MMHG | OXYGEN SATURATION: 98 % | HEIGHT: 72 IN | DIASTOLIC BLOOD PRESSURE: 78 MMHG | BODY MASS INDEX: 22.35 KG/M2 | WEIGHT: 165 LBS

## 2025-08-25 DIAGNOSIS — Z93.1 GASTROSTOMY STATUS: Chronic | ICD-10-CM

## 2025-08-25 DIAGNOSIS — E11.9 TYPE 2 DIABETES MELLITUS W/OUT COMPLICATIONS: ICD-10-CM

## 2025-08-25 DIAGNOSIS — I10 ESSENTIAL (PRIMARY) HYPERTENSION: ICD-10-CM

## 2025-08-25 DIAGNOSIS — G93.1 ANOXIC BRAIN DAMAGE, NOT ELSEWHERE CLASSIFIED: ICD-10-CM

## 2025-08-25 DIAGNOSIS — Z98.890 OTHER SPECIFIED POSTPROCEDURAL STATES: Chronic | ICD-10-CM

## 2025-08-25 DIAGNOSIS — L02.31 CUTANEOUS ABSCESS OF BUTTOCK: Chronic | ICD-10-CM

## 2025-08-25 DIAGNOSIS — R79.89 OTHER SPECIFIED ABNORMAL FINDINGS OF BLOOD CHEMISTRY: ICD-10-CM

## 2025-08-25 DIAGNOSIS — F09 UNSPECIFIED MENTAL DISORDER DUE TO KNOWN PHYSIOLOGICAL CONDITION: ICD-10-CM

## 2025-08-25 DIAGNOSIS — E78.00 PURE HYPERCHOLESTEROLEMIA, UNSPECIFIED: ICD-10-CM

## 2025-08-25 DIAGNOSIS — R97.20 ELEVATED PROSTATE, SPECIFIC ANTIGEN [PSA]: ICD-10-CM

## 2025-08-25 PROBLEM — F03.90 UNSPECIFIED DEMENTIA, UNSPECIFIED SEVERITY, WITHOUT BEHAVIORAL DISTURBANCE, PSYCHOTIC DISTURBANCE, MOOD DISTURBANCE, AND ANXIETY: Chronic | Status: ACTIVE | Noted: 2025-08-22

## 2025-08-25 PROBLEM — Z86.69 PERSONAL HISTORY OF OTHER DISEASES OF THE NERVOUS SYSTEM AND SENSE ORGANS: Chronic | Status: ACTIVE | Noted: 2025-08-22

## 2025-08-25 LAB
ALBUMIN SERPL ELPH-MCNC: 4.5 G/DL — SIGNIFICANT CHANGE UP (ref 3.3–5)
ALP SERPL-CCNC: 106 U/L — SIGNIFICANT CHANGE UP (ref 40–120)
ALT FLD-CCNC: 11 U/L — SIGNIFICANT CHANGE UP (ref 10–50)
ANION GAP SERPL CALC-SCNC: 19 MMOL/L — HIGH (ref 5–17)
AST SERPL-CCNC: 20 U/L — SIGNIFICANT CHANGE UP (ref 10–40)
BILIRUB SERPL-MCNC: 0.7 MG/DL — SIGNIFICANT CHANGE UP (ref 0.2–1.2)
BUN SERPL-MCNC: 21 MG/DL — SIGNIFICANT CHANGE UP (ref 7–23)
CALCIUM SERPL-MCNC: 9.8 MG/DL — SIGNIFICANT CHANGE UP (ref 8.4–10.5)
CHLORIDE SERPL-SCNC: 96 MMOL/L — SIGNIFICANT CHANGE UP (ref 96–108)
CO2 SERPL-SCNC: 25 MMOL/L — SIGNIFICANT CHANGE UP (ref 22–31)
CREAT SERPL-MCNC: 1.29 MG/DL — SIGNIFICANT CHANGE UP (ref 0.5–1.3)
EGFR: 61 ML/MIN/1.73M2 — SIGNIFICANT CHANGE UP
EGFR: 61 ML/MIN/1.73M2 — SIGNIFICANT CHANGE UP
GLUCOSE SERPL-MCNC: 258 MG/DL — HIGH (ref 70–99)
HCT VFR BLD CALC: 39 % — SIGNIFICANT CHANGE UP (ref 39–50)
HGB BLD-MCNC: 12.6 G/DL — LOW (ref 13–17)
MCHC RBC-ENTMCNC: 29.6 PG — SIGNIFICANT CHANGE UP (ref 27–34)
MCHC RBC-ENTMCNC: 32.3 G/DL — SIGNIFICANT CHANGE UP (ref 32–36)
MCV RBC AUTO: 91.8 FL — SIGNIFICANT CHANGE UP (ref 80–100)
PLATELET # BLD AUTO: 224 K/UL — SIGNIFICANT CHANGE UP (ref 150–400)
POTASSIUM SERPL-MCNC: 3.7 MMOL/L — SIGNIFICANT CHANGE UP (ref 3.5–5.3)
POTASSIUM SERPL-SCNC: 3.7 MMOL/L — SIGNIFICANT CHANGE UP (ref 3.5–5.3)
PROT SERPL-MCNC: 7.8 G/DL — SIGNIFICANT CHANGE UP (ref 6–8.3)
RBC # BLD: 4.25 M/UL — SIGNIFICANT CHANGE UP (ref 4.2–5.8)
RBC # FLD: 13.2 % — SIGNIFICANT CHANGE UP (ref 10.3–14.5)
SODIUM SERPL-SCNC: 140 MMOL/L — SIGNIFICANT CHANGE UP (ref 135–145)
WBC # BLD: 5.19 K/UL — SIGNIFICANT CHANGE UP (ref 3.8–10.5)
WBC # FLD AUTO: 5.19 K/UL — SIGNIFICANT CHANGE UP (ref 3.8–10.5)

## 2025-08-25 PROCEDURE — G0463: CPT

## 2025-08-25 PROCEDURE — 99214 OFFICE O/P EST MOD 30 MIN: CPT | Mod: GC

## 2025-08-25 PROCEDURE — 36415 COLL VENOUS BLD VENIPUNCTURE: CPT

## 2025-08-25 PROCEDURE — 80053 COMPREHEN METABOLIC PANEL: CPT

## 2025-08-25 PROCEDURE — 85027 COMPLETE CBC AUTOMATED: CPT

## 2025-08-25 RX ORDER — CEPHALEXIN 500 MG/1
500 CAPSULE ORAL
Qty: 10 | Refills: 0 | Status: ACTIVE | COMMUNITY
Start: 2025-08-25 | End: 1900-01-01

## 2025-08-26 ENCOUNTER — NON-APPOINTMENT (OUTPATIENT)
Age: 65
End: 2025-08-26

## 2025-09-02 ENCOUNTER — RESULT REVIEW (OUTPATIENT)
Age: 65
End: 2025-09-02

## 2025-09-02 ENCOUNTER — APPOINTMENT (OUTPATIENT)
Dept: UROLOGY | Facility: AMBULATORY SURGERY CENTER | Age: 65
End: 2025-09-02

## 2025-09-02 ENCOUNTER — TRANSCRIPTION ENCOUNTER (OUTPATIENT)
Age: 65
End: 2025-09-02

## 2025-09-02 ENCOUNTER — OUTPATIENT (OUTPATIENT)
Dept: OUTPATIENT SERVICES | Facility: HOSPITAL | Age: 65
LOS: 1 days | End: 2025-09-02
Payer: MEDICARE

## 2025-09-02 VITALS
SYSTOLIC BLOOD PRESSURE: 167 MMHG | RESPIRATION RATE: 17 BRPM | HEART RATE: 65 BPM | OXYGEN SATURATION: 100 % | DIASTOLIC BLOOD PRESSURE: 89 MMHG

## 2025-09-02 VITALS
DIASTOLIC BLOOD PRESSURE: 86 MMHG | WEIGHT: 177.91 LBS | OXYGEN SATURATION: 98 % | SYSTOLIC BLOOD PRESSURE: 167 MMHG | HEIGHT: 72 IN | HEART RATE: 57 BPM | RESPIRATION RATE: 20 BRPM | TEMPERATURE: 98 F

## 2025-09-02 DIAGNOSIS — R97.20 ELEVATED PROSTATE SPECIFIC ANTIGEN [PSA]: ICD-10-CM

## 2025-09-02 DIAGNOSIS — Z98.890 OTHER SPECIFIED POSTPROCEDURAL STATES: Chronic | ICD-10-CM

## 2025-09-02 DIAGNOSIS — Z93.1 GASTROSTOMY STATUS: Chronic | ICD-10-CM

## 2025-09-02 DIAGNOSIS — L02.31 CUTANEOUS ABSCESS OF BUTTOCK: Chronic | ICD-10-CM

## 2025-09-02 LAB
GLUCOSE BLDC GLUCOMTR-MCNC: 245 MG/DL — HIGH (ref 70–99)
POTASSIUM BLDV-SCNC: 3.8 MMOL/L — SIGNIFICANT CHANGE UP (ref 3.5–5.1)

## 2025-09-02 PROCEDURE — 55706 BX PRST8 NDL SAT SAMPLING: CPT

## 2025-09-02 PROCEDURE — 88344 IMHCHEM/IMCYTCHM EA MLT ANTB: CPT | Mod: 26

## 2025-09-02 PROCEDURE — G0416: CPT | Mod: 26

## 2025-09-02 PROCEDURE — 76999F: CUSTOM | Mod: 26

## 2025-09-02 RX ORDER — TIMOLOL MALEATE 6.8 MG/ML
1 SOLUTION OPHTHALMIC
Refills: 0 | DISCHARGE

## 2025-09-02 RX ORDER — CHLORTHALIDONE 25 MG/1
1 TABLET ORAL
Refills: 0 | DISCHARGE

## 2025-09-02 RX ORDER — METFORMIN HYDROCHLORIDE 850 MG/1
1 TABLET ORAL
Refills: 0 | DISCHARGE

## 2025-09-02 RX ORDER — AMLODIPINE BESYLATE 10 MG/1
1 TABLET ORAL
Refills: 0 | DISCHARGE

## 2025-09-02 RX ORDER — LABETALOL HYDROCHLORIDE 200 MG/1
1 TABLET, FILM COATED ORAL
Refills: 0 | DISCHARGE

## 2025-09-02 RX ORDER — CARBIDOPA/LEVODOPA 25MG-100MG
1 TABLET ORAL
Refills: 0 | DISCHARGE

## 2025-09-02 RX ORDER — LISINOPRIL 5 MG/1
1 TABLET ORAL
Refills: 0 | DISCHARGE

## 2025-09-02 RX ORDER — SULFAMETHOXAZOLE/TRIMETHOPRIM 800-160 MG
1 TABLET ORAL
Qty: 6 | Refills: 0
Start: 2025-09-02 | End: 2025-09-04

## 2025-09-02 RX ORDER — ATORVASTATIN CALCIUM 80 MG/1
1 TABLET, FILM COATED ORAL
Refills: 0 | DISCHARGE

## 2025-09-04 DIAGNOSIS — R97.20 ELEVATED PROSTATE SPECIFIC ANTIGEN [PSA]: ICD-10-CM

## 2025-09-04 DIAGNOSIS — E78.00 PURE HYPERCHOLESTEROLEMIA, UNSPECIFIED: ICD-10-CM

## 2025-09-04 DIAGNOSIS — R79.89 OTHER SPECIFIED ABNORMAL FINDINGS OF BLOOD CHEMISTRY: ICD-10-CM

## 2025-09-04 DIAGNOSIS — E11.9 TYPE 2 DIABETES MELLITUS WITHOUT COMPLICATIONS: ICD-10-CM

## 2025-09-04 DIAGNOSIS — F09 UNSPECIFIED MENTAL DISORDER DUE TO KNOWN PHYSIOLOGICAL CONDITION: ICD-10-CM

## 2025-09-04 DIAGNOSIS — G93.1 ANOXIC BRAIN DAMAGE, NOT ELSEWHERE CLASSIFIED: ICD-10-CM

## 2025-09-09 ENCOUNTER — NON-APPOINTMENT (OUTPATIENT)
Age: 65
End: 2025-09-09

## 2025-09-09 LAB — SURGICAL PATHOLOGY STUDY: SIGNIFICANT CHANGE UP

## 2025-09-17 ENCOUNTER — APPOINTMENT (OUTPATIENT)
Dept: UROLOGY | Facility: CLINIC | Age: 65
End: 2025-09-17
Payer: MEDICARE

## 2025-09-17 DIAGNOSIS — C61 MALIGNANT NEOPLASM OF PROSTATE: ICD-10-CM

## 2025-09-17 PROCEDURE — 99213 OFFICE O/P EST LOW 20 MIN: CPT

## (undated) DEVICE — PREP CHLORAPREP HI-LITE ORANGE 3ML

## (undated) DEVICE — POSITIONER FOAM EGG CRATE ULNAR 2PCS (PINK)

## (undated) DEVICE — GLV 7.5 PROTEXIS (BLUE)

## (undated) DEVICE — Device

## (undated) DEVICE — SYR LUER LOK 20CC

## (undated) DEVICE — PREP BETADINE KIT

## (undated) DEVICE — BASIN SET DOUBLE

## (undated) DEVICE — NEOGUARD ENDOCAVITY PROBE COVER 1 X 11.8"

## (undated) DEVICE — DRSG TELFA 3 X 8

## (undated) DEVICE — VENODYNE/SCD SLEEVE CALF MEDIUM

## (undated) DEVICE — GRID BRACHYTHERAPY EZ 18G

## (undated) DEVICE — CATH ANGIOCATH 14G

## (undated) DEVICE — NDL MAX CORE 18G X 25CM

## (undated) DEVICE — NDL SPINAL 20G X 6" QUINCKE

## (undated) DEVICE — BALLOON ENDOCAVITY 2X14CM

## (undated) DEVICE — WARMING BLANKET UPPER ADULT